# Patient Record
Sex: MALE | Race: BLACK OR AFRICAN AMERICAN | NOT HISPANIC OR LATINO
[De-identification: names, ages, dates, MRNs, and addresses within clinical notes are randomized per-mention and may not be internally consistent; named-entity substitution may affect disease eponyms.]

---

## 2017-01-05 ENCOUNTER — APPOINTMENT (OUTPATIENT)
Dept: GASTROENTEROLOGY | Facility: CLINIC | Age: 62
End: 2017-01-05

## 2017-01-05 VITALS
HEIGHT: 69 IN | SYSTOLIC BLOOD PRESSURE: 123 MMHG | DIASTOLIC BLOOD PRESSURE: 70 MMHG | BODY MASS INDEX: 26.96 KG/M2 | RESPIRATION RATE: 16 BRPM | WEIGHT: 182 LBS | TEMPERATURE: 98.4 F

## 2017-01-05 DIAGNOSIS — Z28.21 IMMUNIZATION NOT CARRIED OUT BECAUSE OF PATIENT REFUSAL: ICD-10-CM

## 2017-01-05 DIAGNOSIS — Z12.5 ENCOUNTER FOR SCREENING FOR MALIGNANT NEOPLASM OF PROSTATE: ICD-10-CM

## 2017-01-05 DIAGNOSIS — Z12.11 ENCOUNTER FOR SCREENING FOR MALIGNANT NEOPLASM OF COLON: ICD-10-CM

## 2017-01-05 DIAGNOSIS — Z78.9 OTHER SPECIFIED HEALTH STATUS: ICD-10-CM

## 2017-01-05 DIAGNOSIS — Z86.39 PERSONAL HISTORY OF OTHER ENDOCRINE, NUTRITIONAL AND METABOLIC DISEASE: ICD-10-CM

## 2017-01-05 DIAGNOSIS — Z84.89 FAMILY HISTORY OF OTHER SPECIFIED CONDITIONS: ICD-10-CM

## 2017-01-05 DIAGNOSIS — Z86.79 PERSONAL HISTORY OF OTHER DISEASES OF THE CIRCULATORY SYSTEM: ICD-10-CM

## 2017-01-05 DIAGNOSIS — I10 ESSENTIAL (PRIMARY) HYPERTENSION: ICD-10-CM

## 2017-01-05 DIAGNOSIS — F15.90 OTHER STIMULANT USE, UNSPECIFIED, UNCOMPLICATED: ICD-10-CM

## 2017-01-05 DIAGNOSIS — Z82.49 FAMILY HISTORY OF ISCHEMIC HEART DISEASE AND OTHER DISEASES OF THE CIRCULATORY SYSTEM: ICD-10-CM

## 2017-02-01 ENCOUNTER — APPOINTMENT (OUTPATIENT)
Dept: GASTROENTEROLOGY | Facility: AMBULATORY MEDICAL SERVICES | Age: 62
End: 2017-02-01

## 2017-02-03 ENCOUNTER — EMERGENCY (EMERGENCY)
Facility: HOSPITAL | Age: 62
LOS: 1 days | Discharge: HOME | End: 2017-02-03
Attending: EMERGENCY MEDICINE | Admitting: EMERGENCY MEDICINE
Payer: COMMERCIAL

## 2017-02-03 VITALS
HEART RATE: 62 BPM | OXYGEN SATURATION: 100 % | DIASTOLIC BLOOD PRESSURE: 92 MMHG | SYSTOLIC BLOOD PRESSURE: 150 MMHG | TEMPERATURE: 99 F | RESPIRATION RATE: 16 BRPM

## 2017-02-03 PROCEDURE — 99284 EMERGENCY DEPT VISIT MOD MDM: CPT | Mod: 25

## 2017-02-03 NOTE — ED ADULT TRIAGE NOTE - CHIEF COMPLAINT QUOTE
Pt c/o L flank & LLE cramping x 1 wk. Denies dysuria, hematuria, or all other adverse symptoms at this time.

## 2017-02-04 VITALS
DIASTOLIC BLOOD PRESSURE: 89 MMHG | OXYGEN SATURATION: 100 % | SYSTOLIC BLOOD PRESSURE: 148 MMHG | RESPIRATION RATE: 16 BRPM | TEMPERATURE: 98 F | HEART RATE: 65 BPM

## 2017-02-04 LAB
ALBUMIN SERPL ELPH-MCNC: 4.3 G/DL — SIGNIFICANT CHANGE UP (ref 3.3–5)
ALP SERPL-CCNC: 55 U/L — SIGNIFICANT CHANGE UP (ref 40–120)
ALT FLD-CCNC: 26 U/L — SIGNIFICANT CHANGE UP (ref 4–41)
APPEARANCE UR: CLEAR — SIGNIFICANT CHANGE UP
AST SERPL-CCNC: 21 U/L — SIGNIFICANT CHANGE UP (ref 4–40)
BASOPHILS # BLD AUTO: 0.02 K/UL — SIGNIFICANT CHANGE UP (ref 0–0.2)
BASOPHILS NFR BLD AUTO: 0.3 % — SIGNIFICANT CHANGE UP (ref 0–2)
BILIRUB SERPL-MCNC: 0.3 MG/DL — SIGNIFICANT CHANGE UP (ref 0.2–1.2)
BILIRUB UR-MCNC: NEGATIVE — SIGNIFICANT CHANGE UP
BLOOD UR QL VISUAL: NEGATIVE — SIGNIFICANT CHANGE UP
BUN SERPL-MCNC: 14 MG/DL — SIGNIFICANT CHANGE UP (ref 7–23)
CALCIUM SERPL-MCNC: 9.9 MG/DL — SIGNIFICANT CHANGE UP (ref 8.4–10.5)
CHLORIDE SERPL-SCNC: 99 MMOL/L — SIGNIFICANT CHANGE UP (ref 98–107)
CO2 SERPL-SCNC: 26 MMOL/L — SIGNIFICANT CHANGE UP (ref 22–31)
COLOR SPEC: YELLOW — SIGNIFICANT CHANGE UP
CREAT SERPL-MCNC: 1.21 MG/DL — SIGNIFICANT CHANGE UP (ref 0.5–1.3)
EOSINOPHIL # BLD AUTO: 0.13 K/UL — SIGNIFICANT CHANGE UP (ref 0–0.5)
EOSINOPHIL NFR BLD AUTO: 1.9 % — SIGNIFICANT CHANGE UP (ref 0–6)
GLUCOSE SERPL-MCNC: 104 MG/DL — HIGH (ref 70–99)
GLUCOSE UR-MCNC: NEGATIVE — SIGNIFICANT CHANGE UP
HCT VFR BLD CALC: 45.6 % — SIGNIFICANT CHANGE UP (ref 39–50)
HGB BLD-MCNC: 15.3 G/DL — SIGNIFICANT CHANGE UP (ref 13–17)
IMM GRANULOCYTES NFR BLD AUTO: 0 % — SIGNIFICANT CHANGE UP (ref 0–1.5)
KETONES UR-MCNC: NEGATIVE — SIGNIFICANT CHANGE UP
LEUKOCYTE ESTERASE UR-ACNC: NEGATIVE — SIGNIFICANT CHANGE UP
LYMPHOCYTES # BLD AUTO: 3.04 K/UL — SIGNIFICANT CHANGE UP (ref 1–3.3)
LYMPHOCYTES # BLD AUTO: 45.2 % — HIGH (ref 13–44)
MCHC RBC-ENTMCNC: 27.3 PG — SIGNIFICANT CHANGE UP (ref 27–34)
MCHC RBC-ENTMCNC: 33.6 % — SIGNIFICANT CHANGE UP (ref 32–36)
MCV RBC AUTO: 81.3 FL — SIGNIFICANT CHANGE UP (ref 80–100)
MONOCYTES # BLD AUTO: 0.4 K/UL — SIGNIFICANT CHANGE UP (ref 0–0.9)
MONOCYTES NFR BLD AUTO: 5.9 % — SIGNIFICANT CHANGE UP (ref 2–14)
MUCOUS THREADS # UR AUTO: SIGNIFICANT CHANGE UP
NEUTROPHILS # BLD AUTO: 3.14 K/UL — SIGNIFICANT CHANGE UP (ref 1.8–7.4)
NEUTROPHILS NFR BLD AUTO: 46.7 % — SIGNIFICANT CHANGE UP (ref 43–77)
NITRITE UR-MCNC: NEGATIVE — SIGNIFICANT CHANGE UP
NON-SQ EPI CELLS # UR AUTO: <1 — SIGNIFICANT CHANGE UP
PH UR: 5.5 — SIGNIFICANT CHANGE UP (ref 4.6–8)
PLATELET # BLD AUTO: 185 K/UL — SIGNIFICANT CHANGE UP (ref 150–400)
PMV BLD: 11.5 FL — SIGNIFICANT CHANGE UP (ref 7–13)
POTASSIUM SERPL-MCNC: 4.4 MMOL/L — SIGNIFICANT CHANGE UP (ref 3.5–5.3)
POTASSIUM SERPL-SCNC: 4.4 MMOL/L — SIGNIFICANT CHANGE UP (ref 3.5–5.3)
PROT SERPL-MCNC: 7.5 G/DL — SIGNIFICANT CHANGE UP (ref 6–8.3)
PROT UR-MCNC: NEGATIVE — SIGNIFICANT CHANGE UP
RBC # BLD: 5.61 M/UL — SIGNIFICANT CHANGE UP (ref 4.2–5.8)
RBC # FLD: 13.4 % — SIGNIFICANT CHANGE UP (ref 10.3–14.5)
RBC CASTS # UR COMP ASSIST: SIGNIFICANT CHANGE UP (ref 0–?)
SODIUM SERPL-SCNC: 138 MMOL/L — SIGNIFICANT CHANGE UP (ref 135–145)
SP GR SPEC: 1.02 — SIGNIFICANT CHANGE UP (ref 1–1.03)
UROBILINOGEN FLD QL: NORMAL E.U. — SIGNIFICANT CHANGE UP (ref 0.1–0.2)
WBC # BLD: 6.73 K/UL — SIGNIFICANT CHANGE UP (ref 3.8–10.5)
WBC # FLD AUTO: 6.73 K/UL — SIGNIFICANT CHANGE UP (ref 3.8–10.5)
WBC UR QL: SIGNIFICANT CHANGE UP (ref 0–?)

## 2017-02-04 RX ORDER — KETOROLAC TROMETHAMINE 30 MG/ML
15 SYRINGE (ML) INJECTION ONCE
Qty: 0 | Refills: 0 | Status: DISCONTINUED | OUTPATIENT
Start: 2017-02-04 | End: 2017-02-04

## 2017-02-04 RX ORDER — DIAZEPAM 5 MG
1 TABLET ORAL
Qty: 6 | Refills: 0 | OUTPATIENT
Start: 2017-02-04 | End: 2017-02-06

## 2017-02-04 RX ADMIN — Medication 15 MILLIGRAM(S): at 04:35

## 2017-02-04 RX ADMIN — Medication 15 MILLIGRAM(S): at 04:21

## 2017-02-04 NOTE — ED PROVIDER NOTE - MEDICAL DECISION MAKING DETAILS
60 y/o M with DM, HTN presents with left lower back pain. No red flag symptoms. Point tenderness to paraspinal musculature in lumbar spine. Will check kidney function and evaluate for UTI

## 2017-02-04 NOTE — ED ADULT NURSE NOTE - OBJECTIVE STATEMENT
Pt rcvd to 27 c/o 7/10 L Flank rad to L Hip/back of Thigh pain since Saturday.  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  Took unknown pills his wife gave him last night for pain but w/o relief.  Describes pain as cramping, intermitt better/worse but w/o specific triggers.  Pt denies hematuria/dysuria/frequency/fevers/chills or hx kidney stones/UTIs. PMH DM, HTN compliant w/ meds - FS 110s per normal.  Pt aaox4, ambulatory, VSS, in NAD at this time, awaits MD Lemon. IVL placed R AC 20g, labs/urine obtained, will CTM.

## 2017-02-04 NOTE — ED PROVIDER NOTE - OBJECTIVE STATEMENT
60 y/o M wit hh/o HTN, DM presents with left lower back pain x 1 week. Pain is worsened with movement and after standing from sitting for a long time. States there is radiation to the posterior aspect of left leg. No fevers, chills, bowel/bladder incontinence, recent weight loss, trauma ,weakness, numbness, tingling, dysuria, hematuria. 62 y/o M wit hh/o HTN, DM presents with left lower back pain x 1 week. Pain is worsened with movement and after standing from sitting for a long time. States there is radiation to the posterior aspect of left leg. No fevers, chills, bowel/bladder incontinence, recent weight loss, trauma ,weakness, numbness, tingling, dysuria, hematuria.  Attending - Agree with above.  I evaluated patient myself.  62 y/o M c/o left lumbar area back pain x 1 week.  Worse with movement or twisting.  No known preceding trauma.  No abd pain.  No urinary complaints.  Denies any numbness or weakness.  Pain sometimes radiates into left posterior thigh.

## 2017-02-04 NOTE — ED PROVIDER NOTE - PHYSICAL EXAMINATION
ATTENDING PHYSICAL EXAM DR. Graham ***GEN - NAD; well appearing; A+O x3 ***HEAD - NC/AT ***EYES/NOSE - PERRL, EOMI, mucous membranes moist, no discharge ***THROAT: Oral cavity and pharynx normal. No inflammation, swelling, exudate, or lesions.  ***NECK: Neck supple, non-tender without lymphadenopathy, no masses, no JVD.   ***PULMONARY - CTA b/l, symmetric breath sounds. ***CARDIAC -s1s2, RRR, no M,R,G  ***ABDOMEN - +BS, ND, NT, soft, no guarding, no rebound, no masses   ***BACK - no CVA tenderness, mild left lumbar area soreness to palpation ***EXTREMITIES - symmetric pulses, 2+ dp, capillary refill < 2 seconds, no clubbing, no cyanosis, no edema ***SKIN - no rash or bruising   ***NEUROLOGIC - alert, CN 2-12 intact, reflexes nl, sensation nl, coordination nl, finger to nose nl, romberg negative, motor 5/5 RUE/LUE/RLE/LLE/EHL/Plantar flexion, no pronator drift, gait nl

## 2017-02-05 LAB
BACTERIA UR CULT: SIGNIFICANT CHANGE UP
SPECIMEN SOURCE: SIGNIFICANT CHANGE UP

## 2017-03-08 ENCOUNTER — APPOINTMENT (OUTPATIENT)
Dept: GASTROENTEROLOGY | Facility: AMBULATORY MEDICAL SERVICES | Age: 62
End: 2017-03-08

## 2017-03-27 ENCOUNTER — APPOINTMENT (OUTPATIENT)
Dept: GASTROENTEROLOGY | Facility: CLINIC | Age: 62
End: 2017-03-27

## 2017-05-04 ENCOUNTER — APPOINTMENT (OUTPATIENT)
Dept: GASTROENTEROLOGY | Facility: CLINIC | Age: 62
End: 2017-05-04

## 2017-05-04 VITALS
HEIGHT: 69 IN | TEMPERATURE: 98.2 F | DIASTOLIC BLOOD PRESSURE: 80 MMHG | BODY MASS INDEX: 27.4 KG/M2 | WEIGHT: 185 LBS | SYSTOLIC BLOOD PRESSURE: 120 MMHG

## 2017-05-04 DIAGNOSIS — Z09 ENCOUNTER FOR FOLLOW-UP EXAMINATION AFTER COMPLETED TREATMENT FOR CONDITIONS OTHER THAN MALIGNANT NEOPLASM: ICD-10-CM

## 2017-05-04 DIAGNOSIS — R51 HEADACHE: ICD-10-CM

## 2017-05-04 DIAGNOSIS — Z71.89 OTHER SPECIFIED COUNSELING: ICD-10-CM

## 2017-05-04 DIAGNOSIS — E11.9 TYPE 2 DIABETES MELLITUS W/OUT COMPLICATIONS: ICD-10-CM

## 2017-05-04 DIAGNOSIS — K57.30 DIVERTICULOSIS OF LARGE INTESTINE W/OUT PERFORATION OR ABSCESS W/OUT BLEEDING: ICD-10-CM

## 2017-05-04 DIAGNOSIS — K63.5 POLYP OF COLON: ICD-10-CM

## 2018-02-04 ENCOUNTER — INPATIENT (INPATIENT)
Facility: HOSPITAL | Age: 63
LOS: 0 days | Discharge: HOME | End: 2018-02-05
Attending: INTERNAL MEDICINE | Admitting: INTERNAL MEDICINE
Payer: COMMERCIAL

## 2018-02-04 VITALS
DIASTOLIC BLOOD PRESSURE: 75 MMHG | RESPIRATION RATE: 18 BRPM | HEART RATE: 62 BPM | TEMPERATURE: 98 F | OXYGEN SATURATION: 100 % | SYSTOLIC BLOOD PRESSURE: 147 MMHG

## 2018-02-04 DIAGNOSIS — N17.9 ACUTE KIDNEY FAILURE, UNSPECIFIED: ICD-10-CM

## 2018-02-04 DIAGNOSIS — Z29.9 ENCOUNTER FOR PROPHYLACTIC MEASURES, UNSPECIFIED: ICD-10-CM

## 2018-02-04 DIAGNOSIS — E11.9 TYPE 2 DIABETES MELLITUS WITHOUT COMPLICATIONS: ICD-10-CM

## 2018-02-04 DIAGNOSIS — R07.9 CHEST PAIN, UNSPECIFIED: ICD-10-CM

## 2018-02-04 DIAGNOSIS — I10 ESSENTIAL (PRIMARY) HYPERTENSION: ICD-10-CM

## 2018-02-04 DIAGNOSIS — I20.9 ANGINA PECTORIS, UNSPECIFIED: ICD-10-CM

## 2018-02-04 LAB
ALBUMIN SERPL ELPH-MCNC: 4.4 G/DL — SIGNIFICANT CHANGE UP (ref 3.3–5)
ALP SERPL-CCNC: 55 U/L — SIGNIFICANT CHANGE UP (ref 40–120)
ALT FLD-CCNC: 33 U/L — SIGNIFICANT CHANGE UP (ref 4–41)
AST SERPL-CCNC: 30 U/L — SIGNIFICANT CHANGE UP (ref 4–40)
BASOPHILS # BLD AUTO: 0.04 K/UL — SIGNIFICANT CHANGE UP (ref 0–0.2)
BASOPHILS NFR BLD AUTO: 0.6 % — SIGNIFICANT CHANGE UP (ref 0–2)
BILIRUB SERPL-MCNC: 0.2 MG/DL — SIGNIFICANT CHANGE UP (ref 0.2–1.2)
BUN SERPL-MCNC: 17 MG/DL — SIGNIFICANT CHANGE UP (ref 7–23)
CALCIUM SERPL-MCNC: 9.2 MG/DL — SIGNIFICANT CHANGE UP (ref 8.4–10.5)
CHLORIDE SERPL-SCNC: 100 MMOL/L — SIGNIFICANT CHANGE UP (ref 98–107)
CHOLEST SERPL-MCNC: 165 MG/DL — SIGNIFICANT CHANGE UP (ref 120–199)
CK MB BLD-MCNC: 1.3 — SIGNIFICANT CHANGE UP (ref 0–2.5)
CK MB BLD-MCNC: 1.4 — SIGNIFICANT CHANGE UP (ref 0–2.5)
CK MB BLD-MCNC: 2.87 NG/ML — SIGNIFICANT CHANGE UP (ref 1–6.6)
CK MB BLD-MCNC: 3.09 NG/ML — SIGNIFICANT CHANGE UP (ref 1–6.6)
CK MB BLD-MCNC: 3.42 NG/ML — SIGNIFICANT CHANGE UP (ref 1–6.6)
CK SERPL-CCNC: 217 U/L — HIGH (ref 30–200)
CK SERPL-CCNC: 218 U/L — HIGH (ref 30–200)
CK SERPL-CCNC: 237 U/L — HIGH (ref 30–200)
CO2 SERPL-SCNC: 28 MMOL/L — SIGNIFICANT CHANGE UP (ref 22–31)
CREAT SERPL-MCNC: 1.48 MG/DL — HIGH (ref 0.5–1.3)
EOSINOPHIL # BLD AUTO: 0.16 K/UL — SIGNIFICANT CHANGE UP (ref 0–0.5)
EOSINOPHIL NFR BLD AUTO: 2.4 % — SIGNIFICANT CHANGE UP (ref 0–6)
GLUCOSE BLDC GLUCOMTR-MCNC: 115 MG/DL — HIGH (ref 70–99)
GLUCOSE BLDC GLUCOMTR-MCNC: 139 MG/DL — HIGH (ref 70–99)
GLUCOSE SERPL-MCNC: 106 MG/DL — HIGH (ref 70–99)
HBA1C BLD-MCNC: 7.3 % — HIGH (ref 4–5.6)
HCT VFR BLD CALC: 42.1 % — SIGNIFICANT CHANGE UP (ref 39–50)
HDLC SERPL-MCNC: 51 MG/DL — SIGNIFICANT CHANGE UP (ref 35–55)
HGB BLD-MCNC: 14.5 G/DL — SIGNIFICANT CHANGE UP (ref 13–17)
IMM GRANULOCYTES # BLD AUTO: 0.02 # — SIGNIFICANT CHANGE UP
IMM GRANULOCYTES NFR BLD AUTO: 0.3 % — SIGNIFICANT CHANGE UP (ref 0–1.5)
LIPID PNL WITH DIRECT LDL SERPL: 105 MG/DL — SIGNIFICANT CHANGE UP
LYMPHOCYTES # BLD AUTO: 2.58 K/UL — SIGNIFICANT CHANGE UP (ref 1–3.3)
LYMPHOCYTES # BLD AUTO: 38.5 % — SIGNIFICANT CHANGE UP (ref 13–44)
MCHC RBC-ENTMCNC: 27.7 PG — SIGNIFICANT CHANGE UP (ref 27–34)
MCHC RBC-ENTMCNC: 34.4 % — SIGNIFICANT CHANGE UP (ref 32–36)
MCV RBC AUTO: 80.5 FL — SIGNIFICANT CHANGE UP (ref 80–100)
MONOCYTES # BLD AUTO: 0.51 K/UL — SIGNIFICANT CHANGE UP (ref 0–0.9)
MONOCYTES NFR BLD AUTO: 7.6 % — SIGNIFICANT CHANGE UP (ref 2–14)
NEUTROPHILS # BLD AUTO: 3.4 K/UL — SIGNIFICANT CHANGE UP (ref 1.8–7.4)
NEUTROPHILS NFR BLD AUTO: 50.6 % — SIGNIFICANT CHANGE UP (ref 43–77)
NRBC # FLD: 0 — SIGNIFICANT CHANGE UP
PLATELET # BLD AUTO: 183 K/UL — SIGNIFICANT CHANGE UP (ref 150–400)
PMV BLD: 11.1 FL — SIGNIFICANT CHANGE UP (ref 7–13)
POTASSIUM SERPL-MCNC: 4.4 MMOL/L — SIGNIFICANT CHANGE UP (ref 3.5–5.3)
POTASSIUM SERPL-SCNC: 4.4 MMOL/L — SIGNIFICANT CHANGE UP (ref 3.5–5.3)
PROT SERPL-MCNC: 7.2 G/DL — SIGNIFICANT CHANGE UP (ref 6–8.3)
RBC # BLD: 5.23 M/UL — SIGNIFICANT CHANGE UP (ref 4.2–5.8)
RBC # FLD: 13.6 % — SIGNIFICANT CHANGE UP (ref 10.3–14.5)
SODIUM SERPL-SCNC: 140 MMOL/L — SIGNIFICANT CHANGE UP (ref 135–145)
TRIGL SERPL-MCNC: 101 MG/DL — SIGNIFICANT CHANGE UP (ref 10–149)
TROPONIN T SERPL-MCNC: < 0.06 NG/ML — SIGNIFICANT CHANGE UP (ref 0–0.06)
WBC # BLD: 6.71 K/UL — SIGNIFICANT CHANGE UP (ref 3.8–10.5)
WBC # FLD AUTO: 6.71 K/UL — SIGNIFICANT CHANGE UP (ref 3.8–10.5)

## 2018-02-04 PROCEDURE — 71046 X-RAY EXAM CHEST 2 VIEWS: CPT | Mod: 26

## 2018-02-04 RX ORDER — ASPIRIN/CALCIUM CARB/MAGNESIUM 324 MG
162 TABLET ORAL ONCE
Qty: 0 | Refills: 0 | Status: COMPLETED | OUTPATIENT
Start: 2018-02-04 | End: 2018-02-04

## 2018-02-04 RX ORDER — SODIUM CHLORIDE 9 MG/ML
1000 INJECTION INTRAMUSCULAR; INTRAVENOUS; SUBCUTANEOUS ONCE
Qty: 0 | Refills: 0 | Status: COMPLETED | OUTPATIENT
Start: 2018-02-04 | End: 2018-02-04

## 2018-02-04 RX ORDER — ASPIRIN/CALCIUM CARB/MAGNESIUM 324 MG
81 TABLET ORAL DAILY
Qty: 0 | Refills: 0 | Status: DISCONTINUED | OUTPATIENT
Start: 2018-02-05 | End: 2018-02-05

## 2018-02-04 RX ORDER — LISINOPRIL 2.5 MG/1
10 TABLET ORAL DAILY
Qty: 0 | Refills: 0 | Status: DISCONTINUED | OUTPATIENT
Start: 2018-02-04 | End: 2018-02-05

## 2018-02-04 RX ADMIN — Medication 162 MILLIGRAM(S): at 02:32

## 2018-02-04 RX ADMIN — SODIUM CHLORIDE 1000 MILLILITER(S): 9 INJECTION INTRAMUSCULAR; INTRAVENOUS; SUBCUTANEOUS at 12:23

## 2018-02-04 RX ADMIN — LISINOPRIL 10 MILLIGRAM(S): 2.5 TABLET ORAL at 08:58

## 2018-02-04 NOTE — CONSULT NOTE ADULT - SUBJECTIVE AND OBJECTIVE BOX
HISTORY OF PRESENT ILLNESS:61 yo male PMHx of DM and HTN p/w chest pain x 2 days. Chest pain described as midsternal ache, 5/10, radiating to left arm, non-pleuritic and intermittent in nature where each episode lasts about 1 hr. No alleviating or aggravating factors identified. Never had this pain before. Pt denies fever, chills, diaphoresis, SOB, palpitations, nausea, vomiting or abdominal pain. Last stress test reported 2 yrs ago normal. (04 Feb 2018 07:41)  patient seen in er.  no chest pain at present.      PAST MEDICAL & SURGICAL HISTORY:  Diabetes  Hypertension  No significant past surgical history      [ ] Diabetes   [ ] Hypertension  [ ] Hyperlipidemia  [ ] CAD  [ ] PCI  [ ] CABG    PREVIOUS DIAGNOSTIC TESTING:    [ ] Echocardiogram:  [ ]  Catheterization:  [ ] Stress Test:  	    MEDICATIONS:  lisinopril 10 milliGRAM(s) Oral daily                  Allergies    No Known Allergies    Intolerances        FAMILY HISTORY:  No pertinent family history      SOCIAL HISTORY:    [ ] Non-smoker  [ ] Former Smoker  [ ] Current Smoker  [ ] Alcohol      REVIEW OF SYSTEMS:  [ ]chest pain  [  ]shortness of breath  [  ]palpitations  [  ]syncope  [ ]near syncope [  ]diplopia  [  ]altered mental status   [  ]fevers  [ ]chills [ ]nausea  [ ] vomiting  [ ]abdominal pain  [ ]melena  [ ]BRBPR  [  ]epistaxis  [  ]rash  [  ]lower extremity edema          [ ] All others negative	  [ ] Unable to obtain    PHYSICAL EXAM:  T(C): 36.6 (02-04-18 @ 15:30), Max: 36.8 (02-04-18 @ 11:00)  HR: 63 (02-04-18 @ 15:30) (57 - 88)  BP: 127/81 (02-04-18 @ 15:30) (112/62 - 147/75)  RR: 18 (02-04-18 @ 15:30) (15 - 18)  SpO2: 100% (02-04-18 @ 15:30) (97% - 100%)  Wt(kg): --  I&O's Summary      Appearance: No Signs of acute distress  HEENT:   Normal oral mucosa, PERRL, EOMI	  Lymphatic: No lymphadenopathy  Cardiovascular: Normal S1 S2, No JVD, No murmurs, No edema  Respiratory: Lungs clear to auscultation	  Gastrointestinal:  Soft, Non-tender, + BS		  Extremities:  No clubbing, cyanosis or edema  Vascular: Peripheral pulses palpable 2+ bilaterally    TELEMETRY: 	SR     ECG:  sinus bradycardia, non-specific ST/T wave changes	  RADIOLOGY:  CXR   clear lungs     OTHER: 	  	  LABS:	 	    CARDIAC MARKERS:      CKMB: 3.09 ng/mL (02-04 @ 06:45)  CKMB: 3.42 ng/mL (02-04 @ 02:30)    CKMB Relative Index: 1.4 (02-04 @ 06:45)                            14.5   6.71  )-----------( 183      ( 04 Feb 2018 02:30 )             42.1     02-04    140  |  100  |  17  ----------------------------<  106<H>  4.4   |  28  |  1.48<H>    Ca    9.2      04 Feb 2018 02:30    TPro  7.2  /  Alb  4.4  /  TBili  0.2  /  DBili  x   /  AST  30  /  ALT  33  /  AlkPhos  55  02-04    proBNP:   Lipid Profile:   HgA1c: Hemoglobin A1C, Whole Blood: 7.3 % (02-04 @ 06:45)    TSH:     ASSESSMENT/PLAN: 	62y Male with pmhx of HTN , HLD ,   P/W  new onset Chest pain     -CE trops neg x 2     -->218-->   check CE #3 ordered in [place]   -EKG w/o acute changes   -monitor on tele   -f/u Echo   - f/u stress test if + then CATH   -ordered TSH w/ AM labs   -RAND    given IVFs    will d/w attending renal cs HISTORY OF PRESENT ILLNESS:63 yo male PMHx of DM and HTN p/w chest pain x 2 days. Chest pain described as midsternal ache, 5/10, radiating to left arm, non-pleuritic and intermittent in nature where each episode lasts about 1 hr. No alleviating or aggravating factors identified. Never had this pain before. Pt denies fever, chills, diaphoresis, SOB, palpitations, nausea, vomiting or abdominal pain. Last stress test reported 2 yrs ago normal. (04 Feb 2018 07:41)  patient seen in er.  no chest pain at present.      PAST MEDICAL & SURGICAL HISTORY:  Diabetes  Hypertension    No significant past surgical history      [x ] Diabetes   [x ] Hypertension  [ ] Hyperlipidemia  [ ] CAD  [ ] PCI  [ ] CABG    PREVIOUS DIAGNOSTIC TESTING:  NONE in [place]   [ ] Echocardiogram:   [ ]  Catheterization:  [ ] Stress Test:  	    MEDICATIONS:  lisinopril 10 milliGRAM(s) Oral daily    Allergies    No Known Allergies    Intolerances        FAMILY HISTORY:  No pertinent family history      SOCIAL HISTORY:    [x ] Non-smoker  [ ] Former Smoker  [ ] Current Smoker  [ ] Alcohol      REVIEW OF SYSTEMS:  [x ]chest pain  [  ]shortness of breath  [  ]palpitations  [  ]syncope  [ ]near syncope [  ]diplopia  [  ]altered mental status   [  ]fevers  [ ]chills [ ]nausea  [ ] vomiting  [ ]abdominal pain  [ ]melena  [ ]BRBPR  [  ]epistaxis  [  ]rash  [  ]lower extremity edema          [x ] All others negative	  [ ] Unable to obtain    PHYSICAL EXAM:  T(C): 36.6 (02-04-18 @ 15:30), Max: 36.8 (02-04-18 @ 11:00)  HR: 63 (02-04-18 @ 15:30) (57 - 88)  BP: 127/81 (02-04-18 @ 15:30) (112/62 - 147/75)  RR: 18 (02-04-18 @ 15:30) (15 - 18)  SpO2: 100% (02-04-18 @ 15:30) (97% - 100%)  Wt(kg): --  I&O's Summary      Appearance: No Signs of acute distress  HEENT:   Normal oral mucosa, PERRL, EOMI	  Lymphatic: No lymphadenopathy  Cardiovascular: Normal S1 S2, No JVD, No murmurs, No edema  Respiratory: Lungs clear to auscultation	  Gastrointestinal:  Soft, Non-tender, + BS		  Extremities:  No clubbing, cyanosis or edema  Vascular: Peripheral pulses palpable 2+ bilaterally    TELEMETRY: 	SR     ECG:  sinus bradycardia, non-specific ST/T wave changes	  RADIOLOGY:  CXR   clear lungs     OTHER: 	  	  LABS:	 	    CARDIAC MARKERS:      CKMB: 3.09 ng/mL (02-04 @ 06:45)  CKMB: 3.42 ng/mL (02-04 @ 02:30)    CKMB Relative Index: 1.4 (02-04 @ 06:45)                            14.5   6.71  )-----------( 183      ( 04 Feb 2018 02:30 )             42.1     02-04    140  |  100  |  17  ----------------------------<  106<H>  4.4   |  28  |  1.48<H>    Ca    9.2      04 Feb 2018 02:30    TPro  7.2  /  Alb  4.4  /  TBili  0.2  /  DBili  x   /  AST  30  /  ALT  33  /  AlkPhos  55  02-04    proBNP:   Lipid Profile:   HgA1c: Hemoglobin A1C, Whole Blood: 7.3 % (02-04 @ 06:45)    TSH:     ASSESSMENT/PLAN: 	62y Male with pmhx of HTN , HLD ,   P/W  new onset Chest pain     -CE trops neg x 2     -->218-->   check CE #3 ordered in [place]   -EKG w/o acute changes   -monitor on tele   -f/u Echo   - f/u stress test if + then CATH   -ordered TSH w/ AM labs   c/w ASA and lisinopril   -RAND    given IVFs    will d/w attending renal cs

## 2018-02-04 NOTE — CONSULT NOTE ADULT - SUBJECTIVE AND OBJECTIVE BOX
Requesting Physician : Dr. Humphreys    Reason for Consultation: CAD/unstable angina    HISTORY OF PRESENT ILLNESS: HPI:  63 yo male PMHx of DM and HTN p/w chest pain x 2 days. Chest pain described as midsternal ache, 5/10, radiating to left arm, non-pleuritic and intermittent in nature where each episode lasts about 1 hr. No alleviating or aggravating factors identified. Never had this pain before. Pt denies fever, chills, diaphoresis, SOB, palpitations, nausea, vomiting or abdominal pain. Last stress test reported 2 yrs ago normal. (04 Feb 2018 07:41)  patient seen in er.  no chest pain at present.      PAST MEDICAL & SURGICAL HISTORY:  Diabetes  Hypertension  No significant past surgical history          MEDICATIONS:  MEDICATIONS  (STANDING):  lisinopril 10 milliGRAM(s) Oral daily      Allergies    No Known Allergies    Intolerances        FAMILY HISTORY:  No pertinent family history    Non-contributary for premature coronary disease or sudden cardiac death    SOCIAL HISTORY:    [ ] Non-smoker  [ ] Smoker  [ ] Alcohol      REVIEW OF SYSTEMS:  [y ]chest pain  [o  ]shortness of breath  [ y ]palpitations  [n  ]syncope  [n ]near syncope [n ]upper extremity weakness     [ ] All others negative	  [ ] Unable to obtain    PHYSICAL EXAM:  T(C): 36.8 (02-04-18 @ 11:00), Max: 36.8 (02-04-18 @ 11:00)  HR: 88 (02-04-18 @ 11:00) (57 - 88)  BP: 123/81 (02-04-18 @ 11:00) (112/62 - 147/75)  RR: 18 (02-04-18 @ 11:00) (15 - 18)  SpO2: 97% (02-04-18 @ 11:00) (97% - 100%)  Wt(kg): --  I&O's Summary        HEENT:   Normal oral mucosa, PERRL, EOMI	  Lymphatic: No obvious lymphadenopathy , no edema  Cardiovascular: Normal S1 S2, No JVD,  1/6 ADEBAYO murmur , Peripheral pulses palpable 2+ bilaterally  Respiratory: Lungs clear to auscultation, normal effort 	  Gastrointestinal:  Soft, Non-tender, + BS	  Skin: No rashes, No cyanosis, warm to touch  Musculoskeletal: Normal range of motion, normal strength  Psychiatry:  Appropriate Mood & affect   EXT: no clubbing/cyanosis/edma    TELEMETRY: NSR	    ECG:  	NSR/non specific st/t changes  	  	  LABS:	 	    CARDIAC MARKERS:  CARDIAC MARKERS ( 04 Feb 2018 06:45 )  x     / < 0.06 ng/mL / 218 u/L / 3.09 ng/mL / x      CARDIAC MARKERS ( 04 Feb 2018 02:30 )  x     / < 0.06 ng/mL / 237 u/L / 3.42 ng/mL / x                                  14.5   6.71  )-----------( 183      ( 04 Feb 2018 02:30 )             42.1     Hb Trend: 14.5<--    02-04    140  |  100  |  17  ----------------------------<  106<H>  4.4   |  28  |  1.48<H>    Ca    9.2      04 Feb 2018 02:30    TPro  7.2  /  Alb  4.4  /  TBili  0.2  /  DBili  x   /  AST  30  /  ALT  33  /  AlkPhos  55  02-04    Creatinine Trend: 1.48<--     HgA1c: Hemoglobin A1C, Whole Blood: 7.3 % (02-04 @ 06:45)    TSH:     ASSESSMENT/PLAN: 	62yMale with pmhx of htn/hyperlipidemia admitted with chest pain/?unstable angina  1) patient with new onset chest pain --however no acute EKG changes  2) no need for IV heparin at present  3) no need for urgent cath  4) agree with tele  5) iv heparin/cath if CE positive  6) echo/tst if CE negative

## 2018-02-04 NOTE — ED ADULT TRIAGE NOTE - CHIEF COMPLAINT QUOTE
chest pain    intermittent left sided chest pain rad to arm for 1 week.  denies sob, nausea, sweating, dizziness, hx of dm and htn...denies any cardiac hx or surgery

## 2018-02-04 NOTE — H&P ADULT - HISTORY OF PRESENT ILLNESS
61 yo male PMHx of DM and HTN p/w chest pain x 2 days. Chest pain described as midsternal ache, 5/10, radiating to left arm, non-pleuritic and intermittent in nature where each episode lasts about 1 hr. No alleviating or aggravating factors identified. Never had this pain before. Pt denies fever, chills, diaphoresis, SOB, palpitations, nausea, vomiting or abdominal pain. Last stress test reported 2 yrs ago normal.

## 2018-02-04 NOTE — ED PROVIDER NOTE - OBJECTIVE STATEMENT
02:18 Elsa att: 62M h/o htn, dm c/o cp. 02:18 Elsa att: 62M h/o htn, dm c/o cp x 1 wk. Past week intermitt non-exertional left sided chest pain, occ radiation lue.     PMH htn, dm PSH rotator cuff repair MED trulisty, lisinopril, occ baby asa ALL nkda SMOKE denies SH 1-2 drinks 3x a week PCP PHARMACY 02:18 Elsa att: 62M h/o htn, dm c/o cp x 1 wk. Past week intermitt non-exertional left sided chest pain, occ radiation lue. Denies sob, nausea, diaphoresis. Came to ED today for persistent symptoms. November 2017 ekg and monitor, no stress or echo. PMH htn, dm PSH rotator cuff repair MED trulisty, lisinopril, occ baby asa ALL nkda SMOKE denies SH 1-2 drinks 3x a week PCP [facility]

## 2018-02-04 NOTE — H&P ADULT - PROBLEM SELECTOR PLAN 1
tele monitor   cardiac enzymes x 2  Serial EKGs  DASH 1800 ADA diet   continue ASA, lisinopril.   Consider Stress test  FLP, HgA1c

## 2018-02-04 NOTE — H&P ADULT - NSHPLABSRESULTS_GEN_ALL_CORE
EKG: NSR @ 61 bpm  BUN/Cr: 17/1.48  CXR: clear lungs  Dave x1: neg EKG: NSR @ 61 bpm w/ Poor R-wave progression in V1-V6.  BUN/Cr: 17/1.48  CXR: clear lungs  Dave x1: neg

## 2018-02-04 NOTE — H&P ADULT - RS GEN PE MLT RESP DETAILS PC
clear to auscultation bilaterally/good air movement/chest wall tenderness/breath sounds equal/respirations non-labored/airway patent

## 2018-02-04 NOTE — H&P ADULT - NEGATIVE CARDIOVASCULAR SYMPTOMS
no dyspnea on exertion/no claudication/no palpitations/no orthopnea/no paroxysmal nocturnal dyspnea/no peripheral edema

## 2018-02-04 NOTE — CONSULT NOTE ADULT - SUBJECTIVE AND OBJECTIVE BOX
EP ATTENDING      HISTORY OF PRESENT ILLNESS: He is a pleasant 63 y/o male PMH HTN and DM admitted with chest pain x 2 days. His EKG and enzymes x 3 are unremarkable. EP is called because of sinus bradycardia. His baseline QRS is narrow, and he denies symptoms of bradycardia denying syncope, presyncope, nor exercise intolerence. NST and echo pending.    PAST MEDICAL & SURGICAL HISTORY:  Diabetes  Hypertension  asymptomatic sinus bradycardia    No significant past surgical history    MEDICATIONS  (STANDING):  lisinopril 10 milliGRAM(s) Oral daily    No Known Allergies    FAMILY HISTORY:  No pertinent family history  Non-contributary for premature coronary disease or sudden cardiac death    SOCIAL HISTORY:    [x ] Non-smoker  [ ] Smoker  [ ] Alcohol      REVIEW OF SYSTEMS:  [x ]chest pain  [  ]shortness of breath  [  ]palpitations  [  ]syncope  [ ]near syncope [ ]upper extremity weakness   [ ] lower extremity weakness  [  ]diplopia  [  ]altered mental status   [  ]fevers  [ ]chills [ ]nausea  [ ]vomitting  [  ]dysphagia    [ ]abdominal pain  [ ]melena  [ ]BRBPR    [  ]epistaxis  [  ]rash    [ ]lower extremity edema        [x ] All others negative	  [ ] Unable to obtain    PHYSICAL EXAM:  T(C): 36.8 (02-04-18 @ 11:00), Max: 36.8 (02-04-18 @ 11:00)  HR: 88 (02-04-18 @ 11:00) (57 - 88)  BP: 123/81 (02-04-18 @ 11:00) (112/62 - 147/75)  RR: 18 (02-04-18 @ 11:00) (15 - 18)  SpO2: 97% (02-04-18 @ 11:00) (97% - 100%)  Wt(kg): --    Appearance: Normal	  HEENT:   Normal oral mucosa, PERRL, EOMI	  Lymphatic: No lymphadenopathy , no edema  Cardiovascular: Normal S1 S2, No JVD, No murmurs , Peripheral pulses palpable 2+ bilaterally  Respiratory: Lungs clear to auscultation, normal effort 	  Gastrointestinal:  Soft, Non-tender, + BS	  Skin: No rashes, No ecchymoses, No cyanosis, warm to touch  Musculoskeletal: Normal range of motion, normal strength  Psychiatry:  Mood & affect appropriate      TELEMETRY: 	 NSR, sinus bradycardia   ECG:  	sinus bradycardia, non-specific ST/T wave changes    Echo: pending  NST: pending  Cath: n/a  	  	  LABS:	 	                          14.5   6.71  )-----------( 183      ( 04 Feb 2018 02:30 )             42.1     02-04    140  |  100  |  17  ----------------------------<  106<H>  4.4   |  28  |  1.48<H>    Ca    9.2      04 Feb 2018 02:30    TPro  7.2  /  Alb  4.4  /  TBili  0.2  /  DBili  x   /  AST  30  /  ALT  33  /  AlkPhos  55  02-04    proBNP:   Lipid Profile:   HgA1c: Hemoglobin A1C, Whole Blood: 7.3 % (02-04 @ 06:45)    TSH:     A/P) He is a pleasant 63 y/o male PMH HTN and DM admitted with chest pain x 2 days. His EKG and enzymes x 3 are unremarkable. EP is called because of sinus bradycardia. His baseline QRS is narrow, and he denies symptoms of bradycardia denying syncope, presyncope, nor exercise intolerence. NST and echo pending.    -given no symptoms would not recommend PPM  -get echo, get NST  -cath if NST abnormal  -get TSH      Mimi Gonzales M.D., [facility]  Cardiac Electrophysiology  [facility]  [address] [place] [address], [address] [place], [place] [id number]  [email or web address]    office [phone]  pager [phone]

## 2018-02-04 NOTE — H&P ADULT - ASSESSMENT
63 yo male PMHx of DM and HTN p/w chest pain radiating to L arm x 2 days, admitted to [facility] for angina pectoris, r/o ACS.

## 2018-02-05 ENCOUNTER — TRANSCRIPTION ENCOUNTER (OUTPATIENT)
Age: 63
End: 2018-02-05

## 2018-02-05 VITALS
DIASTOLIC BLOOD PRESSURE: 75 MMHG | RESPIRATION RATE: 18 BRPM | OXYGEN SATURATION: 98 % | SYSTOLIC BLOOD PRESSURE: 149 MMHG | HEART RATE: 74 BPM

## 2018-02-05 LAB
BUN SERPL-MCNC: 21 MG/DL — SIGNIFICANT CHANGE UP (ref 7–23)
CALCIUM SERPL-MCNC: 8.8 MG/DL — SIGNIFICANT CHANGE UP (ref 8.4–10.5)
CHLORIDE SERPL-SCNC: 101 MMOL/L — SIGNIFICANT CHANGE UP (ref 98–107)
CK MB BLD-MCNC: 1.1 — SIGNIFICANT CHANGE UP (ref 0–2.5)
CK MB BLD-MCNC: 2.2 NG/ML — SIGNIFICANT CHANGE UP (ref 1–6.6)
CK SERPL-CCNC: 205 U/L — HIGH (ref 30–200)
CO2 SERPL-SCNC: 28 MMOL/L — SIGNIFICANT CHANGE UP (ref 22–31)
CREAT SERPL-MCNC: 1.23 MG/DL — SIGNIFICANT CHANGE UP (ref 0.5–1.3)
GLUCOSE BLDC GLUCOMTR-MCNC: 113 MG/DL — HIGH (ref 70–99)
GLUCOSE SERPL-MCNC: 137 MG/DL — HIGH (ref 70–99)
HCT VFR BLD CALC: 42.9 % — SIGNIFICANT CHANGE UP (ref 39–50)
HGB BLD-MCNC: 14.8 G/DL — SIGNIFICANT CHANGE UP (ref 13–17)
MCHC RBC-ENTMCNC: 28.1 PG — SIGNIFICANT CHANGE UP (ref 27–34)
MCHC RBC-ENTMCNC: 34.5 % — SIGNIFICANT CHANGE UP (ref 32–36)
MCV RBC AUTO: 81.4 FL — SIGNIFICANT CHANGE UP (ref 80–100)
NRBC # FLD: 0 — SIGNIFICANT CHANGE UP
PLATELET # BLD AUTO: 173 K/UL — SIGNIFICANT CHANGE UP (ref 150–400)
PMV BLD: 12.1 FL — SIGNIFICANT CHANGE UP (ref 7–13)
POTASSIUM SERPL-MCNC: 4.3 MMOL/L — SIGNIFICANT CHANGE UP (ref 3.5–5.3)
POTASSIUM SERPL-SCNC: 4.3 MMOL/L — SIGNIFICANT CHANGE UP (ref 3.5–5.3)
RBC # BLD: 5.27 M/UL — SIGNIFICANT CHANGE UP (ref 4.2–5.8)
RBC # FLD: 13.8 % — SIGNIFICANT CHANGE UP (ref 10.3–14.5)
SODIUM SERPL-SCNC: 140 MMOL/L — SIGNIFICANT CHANGE UP (ref 135–145)
TSH SERPL-MCNC: 0.83 UIU/ML — SIGNIFICANT CHANGE UP (ref 0.27–4.2)
WBC # BLD: 5.72 K/UL — SIGNIFICANT CHANGE UP (ref 3.8–10.5)
WBC # FLD AUTO: 5.72 K/UL — SIGNIFICANT CHANGE UP (ref 3.8–10.5)

## 2018-02-05 PROCEDURE — 93306 TTE W/DOPPLER COMPLETE: CPT | Mod: 26

## 2018-02-05 PROCEDURE — 78452 HT MUSCLE IMAGE SPECT MULT: CPT | Mod: 26

## 2018-02-05 PROCEDURE — 93016 CV STRESS TEST SUPVJ ONLY: CPT | Mod: GC

## 2018-02-05 PROCEDURE — 93018 CV STRESS TEST I&R ONLY: CPT | Mod: GC

## 2018-02-05 RX ORDER — INFLUENZA VIRUS VACCINE 15; 15; 15; 15 UG/.5ML; UG/.5ML; UG/.5ML; UG/.5ML
0.5 SUSPENSION INTRAMUSCULAR ONCE
Qty: 0 | Refills: 0 | Status: DISCONTINUED | OUTPATIENT
Start: 2018-02-05 | End: 2018-02-05

## 2018-02-05 RX ADMIN — LISINOPRIL 10 MILLIGRAM(S): 2.5 TABLET ORAL at 06:02

## 2018-02-05 RX ADMIN — Medication 81 MILLIGRAM(S): at 12:17

## 2018-02-05 NOTE — DISCHARGE NOTE ADULT - MEDICATION SUMMARY - MEDICATIONS TO TAKE
I will START or STAY ON the medications listed below when I get home from the hospital:    Ecotrin Adult Low Strength 81 mg oral delayed release tablet  -- 1 tab(s) by mouth once a day  -- Indication: For Heart protection    lisinopril 10 mg oral tablet  -- 1 tab(s) by mouth once a day  -- Indication: For Hypertension    Trulicity Pen  -- 1 dose(s) subcutaneous once a week  -- Indication: For DM2 (diabetes mellitus, type 2)

## 2018-02-05 NOTE — CONSULT NOTE ADULT - SUBJECTIVE AND OBJECTIVE BOX
Patient is a 62y old  Male who presents with a chief complaint of chest pain    HPI:    63 yo male PMHx of DM and HTN came to the [facility] Emergency Department for chest pain x 2 days. He describes his chest pain as midsternal, 5/10, radiating to left arm, non-pleuritic and intermittent in nature where each episode lasts about 1 hr. No alleviating or aggravating factors identified. Never had this pain before. Pt denies fever, chills, diaphoresis, SOB, palpitations, nausea, vomiting or abdominal pain. Last stress test reported 2 yrs ago normal.     PAST MEDICAL & SURGICAL HISTORY:  Diabetes  Hypertension  No significant past surgical history      Review of Systems:   CONSTITUTIONAL: No fever, weight loss, or fatigue  EYES: No eye pain, visual disturbances, or discharge  ENMT:  No difficulty hearing, tinnitus, vertigo; No sinus or throat pain  NECK: No pain or stiffness  RESPIRATORY: No cough, wheezing, chills or hemoptysis; No shortness of breath  CARDIOVASCULAR: see above HPI  GASTROINTESTINAL: No abdominal or epigastric pain. No nausea, vomiting, or hematemesis; No diarrhea or constipation. No melena or hematochezia.  GENITOURINARY: No dysuria, frequency, hematuria, or incontinence  NEUROLOGICAL: No headaches, memory loss, loss of strength, numbness, or tremors  SKIN: No itching, burning, rashes, or lesions   LYMPH NODES: No enlarged glands  ENDOCRINE: No heat or cold intolerance; No hair loss  MUSCULOSKELETAL: No joint pain or swelling; No muscle, back, or extremity pain  PSYCHIATRIC: No depression, anxiety, mood swings, or difficulty sleeping  HEME/LYMPH: No easy bruising, or bleeding gums  ALLERGY AND IMMUNOLOGIC: No hives or eczema    Allergies    No Known Allergies    Intolerances  none        Social History: nonsmoker  no IVDA  lives with family  retired   ETOH 1 - 2 /week CAGE negative     FAMILY HISTORY:  No pertinent family history      MEDICATIONS  (STANDING):  aspirin enteric coated 81 milliGRAM(s) Oral daily  influenza   Vaccine 0.5 milliLiter(s) IntraMuscular once  lisinopril 10 milliGRAM(s) Oral daily    MEDICATIONS  (PRN):      CAPILLARY BLOOD GLUCOSE      POCT Blood Glucose.: 113 mg/dL (05 Feb 2018 09:56)  POCT Blood Glucose.: 139 mg/dL (04 Feb 2018 22:41)    I&O's Summary      PHYSICAL EXAM:  GENERAL: NAD, well-developed  HEAD:  Atraumatic, Normocephalic  EYES: EOMI, PERRLA, conjunctiva and sclera clear  NECK: Supple, No JVD  CHEST/LUNG: Clear to auscultation bilaterally; No wheeze  HEART: S1S2; No rubs, or gallops, no murmurs  ABDOMEN: Soft, Nontender, Nondistended; Bowel sounds present  EXTREMITIES:  Peripheral Pulses, No clubbing or cyanosis, no edema  PSYCH: AO x 3,   NEUROLOGY: Alert, no focal motor or sensory deficits  SKIN: No rashes or lesions    LABS:                        14.8   5.72  )-----------( 173      ( 05 Feb 2018 06:38 )             42.9     02-05    140  |  101  |  21  ----------------------------<  137<H>  4.3   |  28  |  1.23    Ca    8.8      05 Feb 2018 06:38    TPro  7.2  /  Alb  4.4  /  TBili  0.2  /  DBili  x   /  AST  30  /  ALT  33  /  AlkPhos  55  02-04      CARDIAC MARKERS ( 05 Feb 2018 06:38 )  x     / x     / 205 u/L / 2.20 ng/mL / x      CARDIAC MARKERS ( 04 Feb 2018 17:12 )  x     / < 0.06 ng/mL / 217 u/L / 2.87 ng/mL / x      CARDIAC MARKERS ( 04 Feb 2018 06:45 )  x     / < 0.06 ng/mL / 218 u/L / 3.09 ng/mL / x      CARDIAC MARKERS ( 04 Feb 2018 02:30 )  x     / < 0.06 ng/mL / 237 u/L / 3.42 ng/mL / x              RADIOLOGY & ADDITIONAL TESTS:    Consultant(s) Notes Reviewed:      Care Discussed with Consultants/Other Providers:

## 2018-02-05 NOTE — DISCHARGE NOTE ADULT - PLAN OF CARE
Follow up with primary doctor Please follow up with your primary doctor in 1-2 weeks. Continue your medications as prescribed. Continue medications as currently prescribed. Follow up with your PMD for further management of disease. Dash diet. Continue diet modification. Avoid complex carbohydrates such as bread, pasta, cereal, white rice, white potatoes, etc. Avoid concentrated sugar as found in desserts, candy, soda, juice, etc. Consume a diet based on lean protein (chicken, fish) and vegetables.

## 2018-02-05 NOTE — PROGRESS NOTE ADULT - SUBJECTIVE AND OBJECTIVE BOX
Subjective: patient seen/examined. no chest pain. NAD  	  MEDICATIONS:  MEDICATIONS  (STANDING):  aspirin enteric coated 81 milliGRAM(s) Oral daily  influenza   Vaccine 0.5 milliLiter(s) IntraMuscular once  lisinopril 10 milliGRAM(s) Oral daily      LABS:	 	    CARDIAC MARKERS:  CARDIAC MARKERS ( 05 Feb 2018 06:38 )  x     / x     / 205 u/L / 2.20 ng/mL / x      CARDIAC MARKERS ( 04 Feb 2018 17:12 )  x     / < 0.06 ng/mL / 217 u/L / 2.87 ng/mL / x      CARDIAC MARKERS ( 04 Feb 2018 06:45 )  x     / < 0.06 ng/mL / 218 u/L / 3.09 ng/mL / x      CARDIAC MARKERS ( 04 Feb 2018 02:30 )  x     / < 0.06 ng/mL / 237 u/L / 3.42 ng/mL / x                                    14.8   5.72  )-----------( 173      ( 05 Feb 2018 06:38 )             42.9     Hemoglobin: 14.8 g/dL (02-05 @ 06:38)  Hemoglobin: 14.5 g/dL (02-04 @ 02:30)      02-05    140  |  101  |  21  ----------------------------<  137<H>  4.3   |  28  |  1.23    Ca    8.8      05 Feb 2018 06:38    TPro  7.2  /  Alb  4.4  /  TBili  0.2  /  DBili  x   /  AST  30  /  ALT  33  /  AlkPhos  55  02-04    Creatinine Trend: 1.23<--, 1.48<--    TSH: Thyroid Stimulating Hormone, Serum: 0.83 uIU/mL (02-05 @ 06:38)        PHYSICAL EXAM:  T(C): 36.7 (02-05-18 @ 12:08), Max: 36.9 (02-05-18 @ 03:43)  HR: 74 (02-05-18 @ 16:43) (62 - 74)  BP: 149/75 (02-05-18 @ 16:43) (111/68 - 149/75)  RR: 18 (02-05-18 @ 16:43) (16 - 19)  SpO2: 98% (02-05-18 @ 16:43) (98% - 100%)  Wt(kg): --  I&O's Summary        HEENT:   Normal oral mucosa, PERRL, EOMI	  Lymphatic: No obvious lymphadenopathy , no edema  Cardiovascular: Normal S1 S2, No JVD, 1/6 ADEBAYO murmur, Peripheral pulses palpable 2+ bilaterally  Respiratory: Lungs clear to auscultation, normal effort 	  Gastrointestinal:  Soft, Non-tender, + BS	  Skin: No rashes,  No cyanosis, warm to touch  Musculoskeletal: Normal range of motion, normal strength  Psychiatry:  Appropriate Mood & affect     TELEMETRY: 	nsr    ECG:  	nsr/non  specific st/t changes  	      ASSESSMENT/PLAN: 	62y Male with pmhx of htn admitted with chest pain  1) no need for urgent cath  2) MI ruled out with negative CE  3) no evidence of unstable angina  4) echo to assess lv function  5) tst to assess ischemia  6) cath if echo or stress test abnormal

## 2018-02-05 NOTE — DISCHARGE NOTE ADULT - CARE PLAN
Principal Discharge DX:	Chest pain, unspecified type  Goal:	Follow up with primary doctor  Assessment and plan of treatment:	Please follow up with your primary doctor in 1-2 weeks. Continue your medications as prescribed.  Secondary Diagnosis:	Hypertension  Assessment and plan of treatment:	Continue medications as currently prescribed. Follow up with your PMD for further management of disease. Dash diet.  Secondary Diagnosis:	DM2 (diabetes mellitus, type 2)  Assessment and plan of treatment:	Continue diet modification. Avoid complex carbohydrates such as bread, pasta, cereal, white rice, white potatoes, etc. Avoid concentrated sugar as found in desserts, candy, soda, juice, etc. Consume a diet based on lean protein (chicken, fish) and vegetables.

## 2018-02-05 NOTE — PROGRESS NOTE ADULT - SUBJECTIVE AND OBJECTIVE BOX
EP ATTENDING    no palpitations, no syncope, no angina    aspirin enteric coated 81 milliGRAM(s) Oral daily  influenza   Vaccine 0.5 milliLiter(s) IntraMuscular once  lisinopril 10 milliGRAM(s) Oral daily                            14.8   5.72  )-----------( 173      ( 05 Feb 2018 06:38 )             42.9       02-05    140  |  101  |  21  ----------------------------<  137<H>  4.3   |  28  |  1.23    Ca    8.8      05 Feb 2018 06:38    TPro  7.2  /  Alb  4.4  /  TBili  0.2  /  DBili  x   /  AST  30  /  ALT  33  /  AlkPhos  55  02-04      CARDIAC MARKERS ( 05 Feb 2018 06:38 )  x     / x     / 205 u/L / 2.20 ng/mL / x      CARDIAC MARKERS ( 04 Feb 2018 17:12 )  x     / < 0.06 ng/mL / 217 u/L / 2.87 ng/mL / x      CARDIAC MARKERS ( 04 Feb 2018 06:45 )  x     / < 0.06 ng/mL / 218 u/L / 3.09 ng/mL / x      CARDIAC MARKERS ( 04 Feb 2018 02:30 )  x     / < 0.06 ng/mL / 237 u/L / 3.42 ng/mL / x            T(C): 36.7 (02-05-18 @ 12:08), Max: 36.9 (02-05-18 @ 03:43)  HR: 72 (02-05-18 @ 12:08) (62 - 72)  BP: 131/84 (02-05-18 @ 12:08) (111/68 - 131/84)  RR: 19 (02-05-18 @ 12:08) (16 - 19)  SpO2: 100% (02-05-18 @ 12:08) (98% - 100%)  Wt(kg): --    no JVD  RRR, no murmurs  CTAB  soft nt/nd  no c/c/e    echo - normal  NST - normal    A/P) He is a pleasant 61 y/o male PMH HTN and DM admitted with chest pain x 2 days. His EKG and enzymes x 3 are unremarkable. EP is called because of sinus bradycardia. His baseline QRS is narrow, and he denies symptoms of bradycardia denying syncope, presyncope, nor exercise intolerence. NST and echo pending.    -given no symptoms would not recommend PPM  -d/c home given normal echo and NST      Mimi Gonzales M.D., [facility]  Cardiac Electrophysiology  [facility]  [address] [place] [address], -[address] [place] [address]  [email or web address]    office [phone]  pager [phone]

## 2018-02-05 NOTE — CONSULT NOTE ADULT - PROBLEM SELECTOR RECOMMENDATION 4
unclear etiology   ? poor po intake  will send UA to check for protein  unlikely diabetic nephropathy  outpatient follow up with PCP for possible referral to nephrologist

## 2018-02-05 NOTE — CONSULT NOTE ADULT - ASSESSMENT
61 yo male PMH of DM and HTN came to the [facility] Emergency Department for chest pain x 2 days, cardiac enzymes negative so far

## 2018-02-05 NOTE — DISCHARGE NOTE ADULT - HOSPITAL COURSE
61 yo male PMHx of DM and HTN p/w chest pain x 2 days. Chest pain described as midsternal ache, 5/10, radiating to left arm, non-pleuritic and intermittent in nature where each episode lasts about 1 hr. No alleviating or aggravating factors identified. Never had this pain before. Pt denies fever, chills, diaphoresis, SOB, palpitations, nausea, vomiting or abdominal pain. Last stress test reported 2 yrs ago normal. (04 Feb 2018 07:41)    On admission EKG NSR @ 61 and serial cardiac enzyme negative to rule out ACS. CXR was clear. Transthoracic echocardiogram performed showing  mild MR, normal LV function. Stress test revealed ... 63 yo male PMHx of DM and HTN p/w chest pain x 2 days. Chest pain described as midsternal ache, 5/10, radiating to left arm, non-pleuritic and intermittent in nature where each episode lasts about 1 hr. No alleviating or aggravating factors identified. Never had this pain before. Pt denies fever, chills, diaphoresis, SOB, palpitations, nausea, vomiting or abdominal pain. Last stress test reported 2 yrs ago normal. (04 Feb 2018 07:41)    On admission EKG NSR @ 61 and serial cardiac enzyme negative to rule out ACS. CXR was clear. Transthoracic echocardiogram performed showing  mild MR, normal LV function. Stress test revealed normal study. Case was reviewed and cleared for discharge.

## 2018-02-05 NOTE — DISCHARGE NOTE ADULT - CARE PROVIDER_API CALL
Desiree Singh), Cardiovascular Disease; Internal Medicine  [address] [facility] [address] [place] [address]  Phone: [phone]  Fax: [phone]

## 2018-02-05 NOTE — CONSULT NOTE ADULT - ATTENDING COMMENTS
discussed with patient in detail, all questions answered  Thank you for the consultation request. I will follow with you

## 2018-02-05 NOTE — DISCHARGE NOTE ADULT - PATIENT PORTAL LINK FT
You can access the [facility] Patient Portal, offered by [facility], by registering with the following website: [email or web address]

## 2018-07-27 PROBLEM — Z78.9 ALCOHOL USE: Status: ACTIVE | Noted: 2017-01-05

## 2019-09-02 PROBLEM — Z09 FOLLOW UP: Status: ACTIVE | Noted: 2017-01-05

## 2019-11-27 PROBLEM — Z28.21 INFLUENZA VACCINATION DECLINED: Status: RESOLVED | Noted: 2017-01-05 | Resolved: 2019-11-27

## 2020-01-13 ENCOUNTER — EMERGENCY (EMERGENCY)
Facility: HOSPITAL | Age: 65
LOS: 1 days | Discharge: HOME | End: 2020-01-13
Attending: EMERGENCY MEDICINE | Admitting: EMERGENCY MEDICINE
Payer: COMMERCIAL

## 2020-01-13 VITALS
SYSTOLIC BLOOD PRESSURE: 166 MMHG | RESPIRATION RATE: 17 BRPM | TEMPERATURE: 98 F | HEART RATE: 84 BPM | OXYGEN SATURATION: 100 % | DIASTOLIC BLOOD PRESSURE: 80 MMHG

## 2020-01-13 VITALS
OXYGEN SATURATION: 100 % | TEMPERATURE: 98 F | HEART RATE: 86 BPM | RESPIRATION RATE: 16 BRPM | SYSTOLIC BLOOD PRESSURE: 158 MMHG | DIASTOLIC BLOOD PRESSURE: 82 MMHG

## 2020-01-13 PROCEDURE — 99282 EMERGENCY DEPT VISIT SF MDM: CPT

## 2020-01-13 RX ORDER — IBUPROFEN 200 MG
600 TABLET ORAL ONCE
Refills: 0 | Status: COMPLETED | OUTPATIENT
Start: 2020-01-13 | End: 2020-01-13

## 2020-01-13 RX ORDER — ACETAMINOPHEN 500 MG
650 TABLET ORAL ONCE
Refills: 0 | Status: COMPLETED | OUTPATIENT
Start: 2020-01-13 | End: 2020-01-13

## 2020-01-13 RX ADMIN — Medication 600 MILLIGRAM(S): at 12:38

## 2020-01-13 RX ADMIN — Medication 650 MILLIGRAM(S): at 12:38

## 2020-01-13 NOTE — ED PROVIDER NOTE - OBJECTIVE STATEMENT
64M pmhx of DM p/w left upper tooth ache x 3 weeks. Described as achy pain that has been persistent. Went to primary dentist, has root canal pending 1/23. He couldn't bear the pain, and decided to come into the ED to get it done sooner. Has been able to eat/drink and tolerate oral intake. Treated w/ ibuprofen at home w/ mild relief. Denies n/v, neck pain, visual complaints, headaches, abdominal pain, chest pain, sob. 64M pmhx of DM p/w left upper tooth ache x 3 weeks. Described as achy pain that has been persistent. Went to primary dentist, has root canal pending 1/23. He couldn't bear the pain, and decided to come into the ED to get it done sooner. Has been able to eat/drink and tolerate oral intake. Treated w/ ibuprofen at home w/ mild relief. Denies fevers, bleeding or drainage from mouth, n/v, neck pain, visual complaints, headaches, abdominal pain, chest pain, sob.

## 2020-01-13 NOTE — ED PROVIDER NOTE - ENMT, MLM
Airway patent, Nasal mucosa clear. Mouth with normal mucosa. Throat has no vesicles, no oropharyngeal exudates and uvula is midline. ORAL: (+) multiple teeth missing from upper and lower gums. (+) tenderness to percussion of left upper tooth. (-) abscess or purulent drainage seen, and (-) swelling of gums, (-) bleeding, (-) overlying cellulitis on left cheek.

## 2020-01-13 NOTE — ED PROVIDER NOTE - PROGRESS NOTE DETAILS
chely ponce fellow: dental evaluated patient in ed, patient called dental clinic for scheduling beginning of root canal tomorrow. Discussed return precautions and home tx w/ tylenol and ibuprofen. he is well appearing, hemodynamically stable, mentating well, ambulatory for dc home

## 2020-01-13 NOTE — ED ADULT TRIAGE NOTE - CHIEF COMPLAINT QUOTE
Co right upper dental pain x "few weeks". States he is scheduled for root canal on 1/23/2020 but states he "cannot take the pain". Taking ibuprofen with minimal relief.

## 2020-01-13 NOTE — ED PROVIDER NOTE - PATIENT PORTAL LINK FT
You can access the FollowMyHealth Patient Portal offered by [facility] by registering at the following website: [email or web address]. By joining [email or web address]’s FollowMyHealth portal, you will also be able to view your health information using other applications (apps) compatible with our system.

## 2020-01-13 NOTE — ED PROVIDER NOTE - CLINICAL SUMMARY MEDICAL DECISION MAKING FREE TEXT BOX
hx of dm htn p/w left upper toothache, x 3 weeks, due for a root canal 1/23 but persistent pain. hemodynamically stable, mentating well, ambulatory, tolerating oral intake, in no distress. Denies neck pain, headaches, visual complaints, neuro deficits. dental consult, pain control. dispo hx of dm htn p/w left upper toothache, x 3 weeks, due for a root canal 1/23 but persistent pain. hemodynamically stable, mentating well, ambulatory, tolerating oral intake, in no distress. Denies neck pain, headaches, visual complaints, neuro deficits. dental consult, pain control. dispo.

## 2020-01-13 NOTE — PROGRESS NOTE ADULT - SUBJECTIVE AND OBJECTIVE BOX
Patient is a 64y old  Male who presents with a chief complaint of dental pain     HPI: 64M pmhx of DM p/w left upper tooth ache x 3 weeks. Described as achy pain that has been persistent. Went to primary dentist, has root canal pending 1/23 for tooth #2 (patient points to tooth #2 to when asked where the pain comes from). He couldn't bear the pain, and decided to come into the ED to get it done sooner. Has been able to eat/drink and tolerate oral intake. Treated w/ ibuprofen at home w/ mild relief. Denies n/v, neck pain, visual complaints, headaches, abdominal pain, chest pain, sob. Patient states he was told by his outside dentist to present to the ED for RCT. He states he feels pain mainly between the teeth when food gets stuck there, and endorses hot and cold sensitivity. Patient was administered pain medication prior to dental consultation (pt did not report what the med was), and stated it helped his pain significantly so he was not in acute pain at time of dental consult.     PAST MEDICAL & SURGICAL HISTORY:  Diabetes  Hypertension  No significant past surgical history    MEDICATIONS  (STANDING):  MEDICATIONS  (PRN):    Allergies  No Known Allergies  Intolerances    FAMILY HISTORY:  No pertinent family history in first degree relatives    Last Dental Visit: cristian a year, patient has appt for RCT 1/23/2020    Vital Signs Last 24 Hrs  T(C): 36.9 (13 Jan 2020 14:55), Max: 36.9 (13 Jan 2020 14:55)  T(F): 98.4 (13 Jan 2020 14:55), Max: 98.4 (13 Jan 2020 14:55)  HR: 86 (13 Jan 2020 14:55) (84 - 86)  BP: 158/82 (13 Jan 2020 14:55) (158/82 - 166/80)  BP(mean): --  RR: 16 (13 Jan 2020 14:55) (16 - 17)  SpO2: 100% (13 Jan 2020 14:55) (100% - 100%)    EOE:  TMJ ( -  ) clicks                    (  -  ) pops                    (  -  ) crepitus             Mandible FROM             Facial bones and MOM grossly intact             ( -  ) trismus             ( -  ) LAD             ( -  ) swelling             ( -  ) asymmetry             ( -  ) palpation             ( -  ) SOB             ( -  ) dysphagia             ( -  ) LOC    IOE:   Permanent dentition. Multiple missing teeth. Patient wears maxillary and mandibular RPDs. No acute swelling, fistula, erythema, or other signs of acute infection noted.            hard/soft palate:  ( -  ) palatal torus           tongue/FOM WNL           labial/buccal mucosa WNL           ( +  ) percussion - blunted response, likely due to pain medication administration           ( + ) palpation mildly palpation + around tooth #2            ( -  ) swelling            ( + ) caries #2 distal            ( -  ) mobility     DENTAL RADIOGRAPHS: PA and BWX UR taken and interpreted. Findings: tooth #2 large MO and O restorations, large distal caries approximating pulp chamber. #30 D caries. No PARL noted.     ASSESSMENT:  Symptomatic irreversible pulpitis #2     PROCEDURE:  Verbal  consent given. EOE, IOE rads. Reviewed radiographic and clinical findings with patient, advised pain due to symptomatic irreversible pulpitis tooth #2. Advised that no signs of acute odontogenic infection noted at this time so no emergent treatment indicated in the ED setting. Advised that tooth #2 would require definitive treatment, either RCT or exo, neither of which we can offer at the ED. Advised patient to attempt to get in earlier with his outpatient dentist for definitive treatment. If patient unable to, patient can call [facility] to schedule emergency appt for emergency palliative treatment, likely pulpotomy tooth #2. Emphasized that any emergent treatment in the [facility] would also be temporary to relieve some pain, but that patient must maintain his appt with his outpatient dentist and continue for comprehensive dental care.     RECOMMENDATIONS:  1) Dental f/u with outpatient dentist or call [facility] [phone] to schedule emergency treatment for temporary palliative treatment   2) Dental F/U with outpatient dentist for comprehensive dental care.   3) If any difficulty swallowing/breathing, fever occur, return to ED     Mary Perkins DMD and Kervin Carmichael DDS   Dental Pager [phone]

## 2020-01-13 NOTE — ED PROVIDER NOTE - CONSTITUTIONAL, MLM
normal... Well appearing, awake, alert, oriented to person, place, time/situation and in no apparent distress. (+) dentures

## 2020-01-13 NOTE — ED PROVIDER NOTE - ATTENDING CONTRIBUTION TO CARE
TAVON King: I have personally performed a face to face bedside history and physical examination of this patient. I have discussed the history, examination, review of systems, assessment and plan of management with the resident. I have reviewed the electronic medical record and amended it to reflect my history, review of systems, physical exam, assessment and plan.

## 2020-01-13 NOTE — ED PROVIDER NOTE - NSFOLLOWUPINSTRUCTIONS_ED_ALL_ED_FT
Return to the emergency room if you experience worsening symptoms, fevers, vomiting, unable to tolerate oral intake, weakness, or new concerning symptoms.    Follow up with your primary care doctor in 1-2 days.    follow up with dental as scheduled tomorrow.    take ibuprofen 600 mg orally every 6-8 hours for pain control w/ food to avoid upset stomach. May add tylenol or acetaminophen 650 mg orally every 6-8 hours for additional pain control.

## 2020-12-15 PROBLEM — Z12.11 ENCOUNTER FOR SCREENING COLONOSCOPY: Status: RESOLVED | Noted: 2017-01-05 | Resolved: 2020-12-15

## 2021-03-15 NOTE — ED PROVIDER NOTE - NSTIMEPROVIDERCAREINITIATE_GEN_ER
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  See 's notes for details.      -Zio patch 24 hour turned in.  Had symptoms which are typical for her of lightheadedness with position changes.    -OCP for menstrual cramping but still feels tired and weak during menses thru entire time of withdrawal bleeds every month.  Eats spinach.  No red meat.  Eats fish and chicken.  -Never taken a iron supplement  -No further presyncopal epiisodes  -Doesn't like to eat before swim practice due to bothering her stomach  -Menses on this OCP have similar flow to off of OCPs.    -Her father who is a medic thinks her symptoms could be from low iron.   -She doesn't limit salt. Eats salty foods as well and adds slat to some of her foods.     Current Outpatient Medications   Medication     desogestrel-ethinyl estradiol (APRI) 0.15-30 MG-MCG tablet     No current facility-administered medications for this visit.          O: NAD  /77   Pulse 78   Ht 1.753 m (5' 9\")   Wt 66.6 kg (146 lb 12.8 oz)   LMP 03/01/2021 (Approximate)   BMI 21.68 kg/m          Results for WILBUR AMAYA (MRN [id number]) as of 3/15/2021 10:34   Ref. Range 3/3/2021 16:44 3/4/2021 10:30 3/5/2021 14:59 3/5/2021 16:04   Sodium Latest Ref Range: 133 - 144 mmol/L   139    Potassium Latest Ref Range: 3.4 - 5.3 mmol/L   4.2    Chloride Latest Ref Range: 96 - 110 mmol/L   104    Carbon Dioxide Latest Ref Range: 20 - 32 mmol/L   28    Urea Nitrogen Latest Ref Range: 7 - 30 mg/dL   19    Creatinine Latest Ref Range: 0.50 - 1.00 mg/dL   0.77    GFR Estimate Latest Ref Range: >60 mL/min/1.73_m2   >90    GFR Estimate If Black Latest Ref Range: >60 mL/min/1.73_m2   >90    Calcium Latest Ref Range: 8.5 - 10.1 mg/dL   9.3    Anion Gap Latest Ref Range: 3 - 14 mmol/L   8    Albumin Latest Ref Range: 3.4 - 5.0 g/dL   3.4    Protein Total Latest Ref Range: 6.8 - 8.8 g/dL   7.5    Bilirubin Total Latest Ref Range: " 0.2 - 1.3 mg/dL   0.3    Alkaline Phosphatase Latest Ref Range: 40 - 150 U/L   60    ALT Latest Ref Range: 0 - 50 U/L   24    AST Latest Ref Range: 0 - 35 U/L   18    Ferritin Latest Ref Range: 12 - 150 ng/mL 8 (L)      TSH Latest Ref Range: 0.40 - 4.00 mU/L   1.10    Vitamin D Deficiency screening Latest Ref Range: 20 - 75 ug/L   51    Glucose Latest Ref Range: 70 - 99 mg/dL   87    WBC Latest Ref Range: 4.0 - 11.0 10e9/L 5.7 Canceled, Test credited     Hemoglobin Latest Ref Range: 11.7 - 15.7 g/dL 13.3 Canceled, Test credited     Hematocrit Latest Ref Range: 35.0 - 47.0 % 41.1 Canceled, Test credited     Platelet Count Latest Ref Range: 150 - 450 10e9/L 257 Canceled, Test credited     RBC Count Latest Ref Range: 3.8 - 5.2 10e12/L 4.30 Canceled, Test credited     MCV Latest Ref Range: 78 - 100 fl 96 Canceled, Test credited     MCH Latest Ref Range: 26.5 - 33.0 pg 30.9 Canceled, Test credited     MCHC Latest Ref Range: 31.5 - 36.5 g/dL 32.4 Canceled, Test credited     RDW Latest Ref Range: 10.0 - 15.0 % 12.9 Canceled, Test credited     Diff Method Unknown Automated Method      % Neutrophils Latest Units: % 40.0      % Lymphocytes Latest Units: % 49.9      % Monocytes Latest Units: % 7.2      % Eosinophils Latest Units: % 1.8      % Basophils Latest Units: % 0.7      % Immature Granulocytes Latest Units: % 0.4      Nucleated RBCs Latest Ref Range: 0 /100 0      Absolute Neutrophil Latest Ref Range: 1.6 - 8.3 10e9/L 2.3      Absolute Lymphocytes Latest Ref Range: 0.8 - 5.3 10e9/L 2.8      Absolute Monocytes Latest Ref Range: 0.0 - 1.3 10e9/L 0.4      Absolute Eosinophils Latest Ref Range: 0.0 - 0.7 10e9/L 0.1      Absolute Basophils Latest Ref Range: 0.0 - 0.2 10e9/L 0.0      Abs Immature Granulocytes Latest Ref Range: 0 - 0.4 10e9/L 0.0      Absolute Nucleated RBC Unknown 0.0        Zio patch results pending.     Echo and EKG done in the Fall due to COVID RTP were normal.         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   Low ferritin without anemia  Fatigue during withdrawal bleeds on OCP as well as off OCPs.    P:  I have explained that her ferritin is low and not she is not anemic.  If the iron storage continues to decrease she won't be able to make RBC appropriately and she will become anemia and that can definitely effect her performance and may make her dizzy.  I doublt that a ferritin of 8 with a normal hemoglobin would cause that symptom of positional change associated dizziness and that is likely due a responsive vascular system coupled with low intravascular volume.      -She has tried to increase her green vegetables but her ferritin has remained low.   -She won't eat red meat  -Add Vitamin C food source with eating leafy green vegetables.  Start Slow FE one per day.  This will be given to her from [facility].  Discussed side effects.   -Talk with OB-Gyn Doctor about taking an OCP like Seasonique to have only 4 withdrawal bleeds per year vs 12.  This may limit her symptoms of fatigue during withdrawal bleeds as well as lessen iron loss.   -Discussed ways to increase volume of fluid.  She doesn't like plain water but drinks water with BCA and added electrolytes.  Encouraged use of Powerade or other similar product.  Meet with dietician to discuss options/ideas.      Recheck ferritin and hemoglobin in 3 months and have a f/u at that time.      40min of total time spent in one-on-one evalution and discussion with patient regarding nature of problem, course, prior treatments, and therapeutic options,> 50% of which was spent in counseling and coordination of care along with chart review and documentation.  :      Emilia Stout ATC was present for the entire appt/    Latonya Fong MD, CAQ, FACSM, CCD  [facility]  Sports Medicine and Bone Health  Team Physician;  Athletics           04-Feb-2018 01:57

## 2021-06-24 ENCOUNTER — APPOINTMENT (OUTPATIENT)
Dept: OPHTHALMOLOGY | Facility: CLINIC | Age: 66
End: 2021-06-24

## 2021-07-22 ENCOUNTER — NON-APPOINTMENT (OUTPATIENT)
Age: 66
End: 2021-07-22

## 2021-07-22 ENCOUNTER — APPOINTMENT (OUTPATIENT)
Dept: OPHTHALMOLOGY | Facility: CLINIC | Age: 66
End: 2021-07-22
Payer: COMMERCIAL

## 2021-07-22 PROCEDURE — 92004 COMPRE OPH EXAM NEW PT 1/>: CPT

## 2021-07-22 PROCEDURE — 92133 CPTRZD OPH DX IMG PST SGM ON: CPT

## 2021-07-22 PROCEDURE — 99072 ADDL SUPL MATRL&STAF TM PHE: CPT

## 2021-07-22 PROCEDURE — 92015 DETERMINE REFRACTIVE STATE: CPT

## 2021-07-22 PROCEDURE — 92020 GONIOSCOPY: CPT

## 2021-07-22 PROCEDURE — 76514 ECHO EXAM OF EYE THICKNESS: CPT

## 2021-09-24 ENCOUNTER — APPOINTMENT (OUTPATIENT)
Dept: OPHTHALMOLOGY | Facility: CLINIC | Age: 66
End: 2021-09-24

## 2022-05-26 ENCOUNTER — NON-APPOINTMENT (OUTPATIENT)
Age: 67
End: 2022-05-26

## 2022-05-27 ENCOUNTER — APPOINTMENT (OUTPATIENT)
Dept: OPHTHALMOLOGY | Facility: CLINIC | Age: 67
End: 2022-05-27
Payer: COMMERCIAL

## 2022-05-27 PROCEDURE — 92012 INTRM OPH EXAM EST PATIENT: CPT

## 2022-05-27 PROCEDURE — 92083 EXTENDED VISUAL FIELD XM: CPT

## 2022-05-27 PROCEDURE — 92020 GONIOSCOPY: CPT

## 2022-05-30 ENCOUNTER — EMERGENCY (EMERGENCY)
Facility: HOSPITAL | Age: 67
LOS: 1 days | Discharge: HOME | End: 2022-05-30
Admitting: STUDENT IN AN ORGANIZED HEALTH CARE EDUCATION/TRAINING PROGRAM
Payer: COMMERCIAL

## 2022-05-30 VITALS
TEMPERATURE: 97 F | SYSTOLIC BLOOD PRESSURE: 160 MMHG | WEIGHT: 181 LBS | HEIGHT: 66 IN | RESPIRATION RATE: 16 BRPM | HEART RATE: 75 BPM | OXYGEN SATURATION: 98 % | DIASTOLIC BLOOD PRESSURE: 69 MMHG

## 2022-05-30 PROCEDURE — 99284 EMERGENCY DEPT VISIT MOD MDM: CPT

## 2022-05-30 RX ORDER — KETOROLAC TROMETHAMINE 30 MG/ML
15 SYRINGE (ML) INJECTION ONCE
Refills: 0 | Status: DISCONTINUED | OUTPATIENT
Start: 2022-05-30 | End: 2022-05-30

## 2022-05-30 RX ORDER — LIDOCAINE 4 G/100G
1 CREAM TOPICAL ONCE
Refills: 0 | Status: COMPLETED | OUTPATIENT
Start: 2022-05-30 | End: 2022-05-30

## 2022-05-30 RX ORDER — ACETAMINOPHEN 500 MG
650 TABLET ORAL ONCE
Refills: 0 | Status: COMPLETED | OUTPATIENT
Start: 2022-05-30 | End: 2022-05-30

## 2022-05-30 RX ADMIN — LIDOCAINE 1 PATCH: 4 CREAM TOPICAL at 23:18

## 2022-05-30 RX ADMIN — Medication 15 MILLIGRAM(S): at 23:18

## 2022-05-30 RX ADMIN — Medication 650 MILLIGRAM(S): at 23:18

## 2022-05-30 NOTE — ED PROVIDER NOTE - OBJECTIVE STATEMENT
Pt is 67 y/o Male with PMH significant for DM, HTN, chronic back pain for 2 years, presenting with bilateral lower back pain for 3 weeks. Per patient, back pain has been chronic issue for last 2 year; Had epidural injection 6 weeks ago, and starting 3 weeks ago start to develop bilateral lower back pain, radiating down posterior thing with numbness to left big toe that worsening w/ movement, standing and ambulation. Pt denies taking pain medication as well as fever, chills, n/v/d, weakness, urinary/fecal incontinence, saddle anesthesia, recent weight change, and Injury/trauma.

## 2022-05-30 NOTE — ED PROVIDER NOTE - PATIENT PORTAL LINK FT
You can access the FollowMyHealth Patient Portal offered by [facility] by registering at the following website: [email or web address]. By joining [employer]’s FollowMyHealth portal, you will also be able to view your health information using other applications (apps) compatible with our system.

## 2022-05-30 NOTE — ED PROVIDER NOTE - CHPI ED SYMPTOMS NEG
No fever, chills, no nausea, no vomiting, no diarrhea, no weakness, no saddle anesthesia, no recent weight change/no bladder dysfunction/no bowel dysfunction/no motor function loss

## 2022-05-30 NOTE — ED ADULT TRIAGE NOTE - CHIEF COMPLAINT QUOTE
pt amb to triage c/o lower back pain b/l x 2 years radiating to b/l thighs, denies falls or trauma, + pedal pulses, + ROM in lower extremities, states pain worsened today and presented to ED for eval, Hx herniated disks, received injection 4-5 weeks ago

## 2022-05-30 NOTE — ED PROVIDER NOTE - NSFOLLOWUPINSTRUCTIONS_ED_ALL_ED_FT
Follow with your PMD within 48-72 hours.  Rest, no heavy lifting.  Warm compresses to area. Recommend your Ortho for follow up.  Light walking. Take Motrin 600 mg every 6-8 hours for pain with food, Take Flexeril 10 mg every 8 hours as needed for muscle spasm -- causes drowsiness; no drinking alcohol or driving with this medication. Any worsening pain, weakness, numbness, bowel or urinary incontinence or new concerning symptoms return to the Emergency Department.

## 2022-05-30 NOTE — ED PROVIDER NOTE - GASTROINTESTINAL NEGATIVE STATEMENT, MLM
no abdominal pain, no bloating, no constipation, no diarrhea, no nausea and no vomiting. No fecal incontinence

## 2022-05-30 NOTE — ED PROVIDER NOTE - CARDIAC, MLM
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 Postures (decerebrates) to mild pain and touch  No seizures reported        ASSESSMENT/PLAN:  Overall neurological status potentially calamitous.   Very poor prognosis  We will check EEG    ALLERGIES:    No Known Allergies    MEDS:      Current Facility-Administered Medications:     potassium, sodium phosphates (NEUTRA-PHOS) packet 2 Packet, 2 Packet, Oral, Q4H, Jame Erickson MD    levETIRAcetam (KEPPRA) 500 mg in saline (iso-osm) 100 mL IVPB (premix), 500 mg, IntraVENous, Q12H, Lupis Taylor MD, 500 mg at 08/05/21 0610    metoclopramide HCl (REGLAN) injection 10 mg, 10 mg, IntraVENous, Q4H PRN, Sangeeta Taylor MD    0.9% sodium chloride infusion, 100 mL/hr, IntraVENous, CONTINUOUS, Lupis Taylor MD, Last Rate: 100 mL/hr at 08/04/21 0855, 100 mL/hr at 08/04/21 0855    acetaminophen (TYLENOL) tablet 650 mg, 650 mg, Oral, Q6H PRN **OR** acetaminophen (TYLENOL) suppository 650 mg, 650 mg, Rectal, Q6H PRN, Sangeeta Taylor MD    polyethylene glycol (MIRALAX) packet 17 g, 17 g, Oral, DAILY PRN, Sangeeta Taylor MD    ondansetron (ZOFRAN ODT) tablet 4 mg, 4 mg, Oral, Q8H PRN **OR** ondansetron (ZOFRAN) injection 4 mg, 4 mg, IntraVENous, Q6H PRN, Lupis Taylor MD    enoxaparin (LOVENOX) injection 40 mg, 40 mg, SubCUTAneous, DAILY, Lupis Taylor MD, 40 mg at 08/05/21 0800    aspirin tablet 325 mg, 325 mg, Oral, DAILY, Lupis Taylor MD, 325 mg at 08/05/21 0800    atorvastatin (LIPITOR) tablet 40 mg, 40 mg, Oral, QHS, Luips Taylor MD, 40 mg at 08/04/21 2157    piperacillin-tazobactam (ZOSYN) 3.375 g in 0.9% sodium chloride (MBP/ADV) 100 mL MBP, 3.375 g, IntraVENous, Q8H, Lupis Taylor MD, Last Rate: 200 mL/hr at 08/05/21 0402, 3.375 g at 08/05/21 0402    propofol (DIPRIVAN) 10 mg/mL infusion, 0-50 mcg/kg/min, IntraVENous, TITRATE, Israel Fatima MD, Last Rate: 3.9 mL/hr at 08/04/21 1800, 10 mcg/kg/min at 08/04/21 1800    dexamethasone (DECADRON) 4 mg/mL injection 4 mg, 4 mg, IntraVENous, Q6H, Israel Fatima MD, 4 mg at 08/05/21 0610    NOREPINephrine (LEVOPHED) 8 mg in 5% dextrose 250mL (32 mcg/mL) infusion, 10 mcg/min, IntraVENous, TITRATE, Lupis Taylor MD, Last Rate: 9.4 mL/hr at 08/05/21 0755, 5 mcg/min at 08/05/21 0755    LABS:  Recent Results (from the past 24 hour(s))   CULTURE, BLOOD    Collection Time: 08/04/21 10:20 AM    Specimen: Blood   Result Value Ref Range    Special Requests: No Special Requests      Culture result: No growth after 19 hours     PROLACTIN    Collection Time: 08/04/21 10:20 AM   Result Value Ref Range    Prolactin 19.0 ng/mL   CULTURE, BLOOD    Collection Time: 08/04/21 10:25 AM    Specimen: Blood   Result Value Ref Range    Special Requests: No Special Requests      Culture result: No growth after 19 hours     URINALYSIS W/MICROSCOPIC    Collection Time: 08/04/21 11:00 AM   Result Value Ref Range    Color Yellow/Straw      Appearance Clear Clear      Specific gravity 1.025 1.003 - 1.030      pH (UA) 7.0 5.0 - 8.0      Protein 30 (A) Negative mg/dL    Glucose Negative Negative mg/dL    Ketone 5 (A) Negative mg/dL    Bilirubin Negative Negative      Blood Negative Negative      Urobilinogen 4.0 (H) 0.1 - 1.0 EU/dL    Nitrites Negative Negative      Leukocyte Esterase Negative Negative      WBC 0-5 0 - 4 /hpf    RBC 0-5 0 - 5 /hpf    Bacteria Negative Negative /hpf   DRUG SCREEN, URINE    Collection Time: 08/04/21 11:00 AM   Result Value Ref Range    AMPHETAMINES Negative Negative      BARBITURATES Negative Negative      BENZODIAZEPINES Negative Negative      COCAINE Negative Negative      METHADONE Negative Negative      OPIATES Negative Negative      PCP(PHENCYCLIDINE) Negative Negative      THC (TH-CANNABINOL) Negative Negative      Drug screen comment        This test is a screen for drugs of abuse in a medical setting only (i.e., they are unconfirmed results and as such must not be used for non-medical purposes, e.g.,employment testing, legal testing). Due to its inherent nature, false positive (FP) and false negative (FN) results may be obtained. Therefore, if necessary for medical care, recommend confirmation of positive findings by GC/MS. DRUG SCREEN, URINE    Collection Time: 08/04/21 11:00 AM   Result Value Ref Range    AMPHETAMINES Negative Negative      BARBITURATES Negative Negative      BENZODIAZEPINES Negative Negative      COCAINE Negative Negative      METHADONE Negative Negative      OPIATES Negative Negative      PCP(PHENCYCLIDINE) Negative Negative      THC (TH-CANNABINOL) Negative Negative      Drug screen comment        This test is a screen for drugs of abuse in a medical setting only (i.e., they are unconfirmed results and as such must not be used for non-medical purposes, e.g.,employment testing, legal testing). Due to its inherent nature, false positive (FP) and false negative (FN) results may be obtained. Therefore, if necessary for medical care, recommend confirmation of positive findings by GC/MS.    GLUCOSE, POC    Collection Time: 08/04/21  5:10 PM   Result Value Ref Range    Glucose (POC) 96 65 - 117 mg/dL    Performed by Carlos Enrique Latham    BLOOD GAS, ARTERIAL    Collection Time: 08/04/21  5:14 PM   Result Value Ref Range    pH 7.47 (H) 7.35 - 7.45      PCO2 31 (L) 35 - 45 mmHg    PO2 54 (L) 75 - 100 mmHg    O2 SAT 89 (L) >95 %    BICARBONATE 24 22 - 26 mmol/L    BASE DEFICIT 0.2 0 - 2 mmol/L    O2 METHOD VENT      FIO2 100.0 %    MODE Assist Control/Volume Control      Tidal volume 500      SET RATE 12      IPAP/PIP 0      EPAP/CPAP/PEEP 5.0      SITE Right Brachial      KRUNAL'S TEST PASS      Critical value read back DR NIDHI Swanson W/MICROSCOPIC    Collection Time: 08/04/21  6:41 PM   Result Value Ref Range    Color Yellow/Straw Appearance Clear Clear      Specific gravity 1.026 1.003 - 1.030      pH (UA) 6.0 5.0 - 8.0      Protein 30 (A) Negative mg/dL    Glucose 50 (A) Negative mg/dL    Ketone 5 (A) Negative mg/dL    Bilirubin Negative Negative      Blood Negative Negative      Urobilinogen 4.0 (H) 0.1 - 1.0 EU/dL    Nitrites Negative Negative      Leukocyte Esterase Negative Negative      WBC 0-4 0 - 4 /hpf    RBC 0-5 0 - 5 /hpf    Bacteria Negative Negative /hpf    Mucus Trace /lpf   MRSA SCREEN - PCR (NASAL)    Collection Time: 08/04/21  6:41 PM   Result Value Ref Range    MRSA by PCR, Nasal Not Detected Not Detected     TSH 3RD GENERATION    Collection Time: 08/04/21  7:25 PM   Result Value Ref Range    TSH 0.90 0.36 - 3.74 uIU/mL   LACTIC ACID    Collection Time: 08/04/21  7:25 PM   Result Value Ref Range    Lactic acid 5.5 (HH) 0.4 - 2.0 mmol/L   BNP    Collection Time: 08/04/21  7:25 PM   Result Value Ref Range    NT pro- (H) <125 pg/mL   TROPONIN I    Collection Time: 08/04/21  7:25 PM   Result Value Ref Range    Troponin-I, Qt. 0.38 (H) <9.24 ng/mL   METABOLIC PANEL, COMPREHENSIVE    Collection Time: 08/05/21  1:02 AM   Result Value Ref Range    Sodium 137 136 - 145 mmol/L    Potassium 3.9 3.5 - 5.1 mmol/L    Chloride 108 97 - 108 mmol/L    CO2 19 (L) 21 - 32 mmol/L    Anion gap 10 5 - 15 mmol/L    Glucose 119 (H) 65 - 100 mg/dL    BUN 22 (H) 6 - 20 mg/dL    Creatinine 1.32 (H) 0.70 - 1.30 mg/dL    BUN/Creatinine ratio 17 12 - 20      GFR est AA >60 >60 ml/min/1.73m2    GFR est non-AA 54 (L) >60 ml/min/1.73m2    Calcium 6.1 (LL) 8.5 - 10.1 mg/dL    Bilirubin, total 1.6 (H) 0.2 - 1.0 mg/dL    AST (SGOT) 84 (H) 15 - 37 U/L    ALT (SGPT) <6 (L) 12 - 78 U/L    Alk.  phosphatase 48 45 - 117 U/L    Protein, total 6.6 6.4 - 8.2 g/dL    Albumin 2.7 (L) 3.5 - 5.0 g/dL    Globulin 3.9 2.0 - 4.0 g/dL    A-G Ratio 0.7 (L) 1.1 - 2.2     RENAL FUNCTION PANEL    Collection Time: 08/05/21  1:02 AM   Result Value Ref Range    Sodium 136 136 - 145 mmol/L    Potassium 3.8 3.5 - 5.1 mmol/L    Chloride 107 97 - 108 mmol/L    CO2 23 21 - 32 mmol/L    Anion gap 6 5 - 15 mmol/L    Glucose 125 (H) 65 - 100 mg/dL    BUN 22 (H) 6 - 20 mg/dL    Creatinine 1.29 0.70 - 1.30 mg/dL    BUN/Creatinine ratio 17 12 - 20      GFR est AA >60 >60 ml/min/1.73m2    GFR est non-AA 56 (L) >60 ml/min/1.73m2    Calcium 8.3 (L) 8.5 - 10.1 mg/dL    Phosphorus 1.4 (L) 2.6 - 4.7 mg/dL    Albumin 2.7 (L) 3.5 - 5.0 g/dL   MAGNESIUM    Collection Time: 08/05/21  1:02 AM   Result Value Ref Range    Magnesium 1.9 1.6 - 2.4 mg/dL   TROPONIN I    Collection Time: 08/05/21  1:02 AM   Result Value Ref Range    Troponin-I, Qt. 1.38 (H) <0.05 ng/mL   AMMONIA    Collection Time: 08/05/21  1:02 AM   Result Value Ref Range    Ammonia 37 (H) <32 umol/L   BLOOD GAS, ARTERIAL    Collection Time: 08/05/21  4:00 AM   Result Value Ref Range    pH 7.45 7.35 - 7.45      PCO2 37 35 - 45 mmHg    PO2 134 (H) 75 - 100 mmHg    O2 SAT 98 >95 %    BICARBONATE 26 22 - 26 mmol/L    BASE EXCESS 1.8 0 - 2 mmol/L    O2 METHOD VENT      FIO2 80.0 %    MODE Assist Control/Volume Control      Tidal volume 500      SET RATE 12      IPAP/PIP 0      EPAP/CPAP/PEEP 5.0      SITE Right Radial      KRUNAL'S TEST PASS     CBC WITH AUTOMATED DIFF    Collection Time: 08/05/21  4:15 AM   Result Value Ref Range    WBC 13.3 (H) 4.1 - 11.1 K/uL    RBC 4.79 4.10 - 5.70 M/uL    HGB 14.5 12.1 - 17.0 g/dL    HCT 43.4 36.6 - 50.3 %    MCV 90.6 80.0 - 99.0 FL    MCH 30.3 26.0 - 34.0 PG    MCHC 33.4 30.0 - 36.5 g/dL    RDW 13.1 11.5 - 14.5 %    PLATELET 172 999 - 451 K/uL    MPV 11.9 8.9 - 12.9 FL    NRBC 0.0 0.0  WBC    ABSOLUTE NRBC 0.00 0.00 - 0.01 K/uL    NEUTROPHILS 66 32 - 75 %    BAND NEUTROPHILS 23 (H) 0 - 6 %    LYMPHOCYTES 5 (L) 12 - 49 %    MONOCYTES 6 5 - 13 %    EOSINOPHILS 0 0 - 7 %    BASOPHILS 0 0 - 1 %    IMMATURE GRANULOCYTES 0 %    ABS. NEUTROPHILS 11.8 (H) 1.8 - 8.0 K/UL    ABS.  LYMPHOCYTES 0.7 (L) 0.8 - 3.5 K/UL ABS. MONOCYTES 0.8 0.0 - 1.0 K/UL    ABS. EOSINOPHILS 0.0 0.0 - 0.4 K/UL    ABS. BASOPHILS 0.0 0.0 - 0.1 K/UL    ABS. IMM. GRANS. 0.0 K/UL    DF Manual      RBC COMMENTS Normocytic, Normochromic         Visit Vitals  /80 (BP 1 Location: Left upper arm, BP Patient Position: At rest)   Pulse (!) 113   Temp 99.9 °F (37.7 °C)   Resp 20   Ht 5' 5\" (1.651 m)   Wt 65.8 kg (145 lb)   SpO2 100%   BMI 24.13 kg/m²       Imaging:  XR CHEST PORT   Final Result      XR CHEST PORT   Final Result   ET tube placed, with tip 7 mm above the doretha. Asymmetric airspace   disease noted throughout the left lung, consistent with pneumonia, improved from   previous. Right lung clear. Stable normal heart size. Calcified aortic knob. XR CHEST PORT   Final Result   New diffuse airspace opacity in the left lung. MRI BRAIN WO CONT   Final Result   1. Restricted diffusion involving the cerebellar hemispheres, nemesio, and   possibly the left midbrain most likely representing acute ischemic infarcts   although the brainstem involvement may be partially artifactual. Consider a   short-term interval follow-up MRI. 2.  Attenuated vertebrobasilar flow voids which may be related to underlying   stenosis. CTA or MRA is recommended for further evaluation. 3.  Extensive multifocal encephalomalacia as described above. Report called [facility] the [facility] at 5:45 PM on   8/4/2021 via phone conversation. XR CHEST PORT   Final Result   The cardiomediastinal silhouette is appropriate for age, technique,   and lung expansion. Pulmonary vasculature is not congested. The lungs are   essentially clear. No effusion or pneumothorax is seen. CT CODE NEURO HEAD WO CONTRAST   Final Result      No acute intracranial bleed. Bilateral areas of encephalomalacia. Atherosclerosis. A noncontrast head CT does not exclude the possibility of an   acute ischemic stroke.  MRI is more sensitive for evaluation of brain parenchyma. Clinical correlation is recommended. Follow-up as clinically indicated. The   results were called and verbally communicated to Dr. Coleen Aguilera on 8/4/2021 at   1:00 AM.      Dental disease. Please see full report.       DUPLEX CAROTID BILATERAL    (Results Pending)   XR CHEST PORT    (Results Pending) Normal rate, regular rhythm.  Heart sounds S1, S2.  No murmurs, rubs or gallops.

## 2022-05-30 NOTE — ED PROVIDER NOTE - CLINICAL SUMMARY MEDICAL DECISION MAKING FREE TEXT BOX
Pt is 67 y/o Male with PMH significant for DM, HTN, chronic back pain for 2 years, presenting with bilateral lower back pain for 3 weeks ago. Concern for Disc herniation. Pt declined surgical Option. No Red Flags, Pt ambulatory. Will provide pain control, lidocaine, Tylenol and Toradol. Warm compress for Sx care, F/u ortho outpatient.

## 2022-05-30 NOTE — ED PROVIDER NOTE - NEUROLOGICAL, MLM
Alert and oriented, no focal deficits, no motor deficits; Negative foot drop. Decrease sensory in left big toe.

## 2022-05-30 NOTE — ED PROVIDER NOTE - PROGRESS NOTE DETAILS
PA JULIO: Patient reassessed, sitting comfortably in chair in NAD, denies any complaints. States feeling better, symptoms improved. Pt is medically stable for discharge and follow up with PMD & ortho. The patient was given verbal and written discharge instructions. Specifically, instructions when to return to the ED and when to seek follow-up from their pcp was discussed. Any specialty follow-up was discussed, including how to make an appointment.  Instructions were discussed in simple, plain language and was understood by the patient. The patient understands that should their symptoms worsen or any new symptoms arise, they should return to the ED immediately for further evaluation. All pt's questions were answered. Patient verbalizes understanding.

## 2022-05-31 RX ORDER — CYCLOBENZAPRINE HYDROCHLORIDE 10 MG/1
1 TABLET, FILM COATED ORAL
Qty: 21 | Refills: 0
Start: 2022-05-31 | End: 2022-06-06

## 2022-07-20 ENCOUNTER — EMERGENCY (EMERGENCY)
Facility: HOSPITAL | Age: 67
LOS: 0 days | Discharge: HOME | End: 2022-07-21
Attending: STUDENT IN AN ORGANIZED HEALTH CARE EDUCATION/TRAINING PROGRAM

## 2022-07-20 VITALS
HEART RATE: 67 BPM | OXYGEN SATURATION: 95 % | RESPIRATION RATE: 16 BRPM | SYSTOLIC BLOOD PRESSURE: 137 MMHG | WEIGHT: 179.9 LBS | DIASTOLIC BLOOD PRESSURE: 83 MMHG | TEMPERATURE: 98 F | HEIGHT: 66 IN

## 2022-07-20 DIAGNOSIS — I10 ESSENTIAL (PRIMARY) HYPERTENSION: ICD-10-CM

## 2022-07-20 DIAGNOSIS — E11.9 TYPE 2 DIABETES MELLITUS WITHOUT COMPLICATIONS: ICD-10-CM

## 2022-07-20 DIAGNOSIS — R51.9 HEADACHE, UNSPECIFIED: ICD-10-CM

## 2022-07-20 DIAGNOSIS — Z79.84 LONG TERM (CURRENT) USE OF ORAL HYPOGLYCEMIC DRUGS: ICD-10-CM

## 2022-07-20 PROCEDURE — 99284 EMERGENCY DEPT VISIT MOD MDM: CPT

## 2022-07-20 RX ORDER — ACETAMINOPHEN 500 MG
650 TABLET ORAL ONCE
Refills: 0 | Status: COMPLETED | OUTPATIENT
Start: 2022-07-20 | End: 2022-07-20

## 2022-07-20 NOTE — ED PROVIDER NOTE - NSFOLLOWUPINSTRUCTIONS_ED_ALL_ED_FT
Take over the counter acetaminophen or ibuprofen as needed for pain. Follow the instructions on the bottle. followup with your primary care provider as scheduled or in two weeks if not better. You can ice the area for 20 minutes every 3 hours as needed for pain.

## 2022-07-20 NOTE — ED PROVIDER NOTE - OBJECTIVE STATEMENT
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 Worried skull might be broken. No loc, no other symptoms, no nausea, vomiting, amnesia, numbness or weakness 65 yo man, [facility] presents with left parietal ache, sharp, mild to moderate, constant after struck his head while he was under a bus and hit his head on the frame while he tried to stand up. Worried skull might be broken. No loc, no other symptoms, no nausea, vomiting, amnesia, numbness or weakness. Has not taken any medication for the pain.

## 2022-07-20 NOTE — ED ADULT TRIAGE NOTE - AS TEMP SITE
Additional Note: Left flank
Detail Level: Detailed
Hide Include Location In Plan Question?: No
no apparent risk to self
oral

## 2022-07-20 NOTE — ED PROVIDER NOTE - PATIENT PORTAL LINK FT
You can access the FollowMyHealth Patient Portal offered by [facility] by registering at the following website: [email or web address]. By joining [facility]’s FollowMyHealth portal, you will also be able to view your health information using other applications (apps) compatible with our system.

## 2022-07-20 NOTE — ED ADULT TRIAGE NOTE - CHIEF COMPLAINT QUOTE
pt states he hit his head underneath a bus while at work at 1:30pm.  denies LOC.  pt has hematoma on L-occiptal area.  denies anticoags  denies blurry vision/n/v

## 2022-07-20 NOTE — ED ADULT TRIAGE NOTE - NS ED NURSE BANDS TYPE
REVIEW OF SYSTEMS:    CONSTITUTIONAL: No weakness, fevers or chills  EYES/ENT: No visual changes;  No vertigo or throat pain   NECK: No pain or stiffness  RESPIRATORY: No cough, wheezing, hemoptysis; No shortness of breath  CARDIOVASCULAR: No chest pain or palpitations  GASTROINTESTINAL: No abdominal or epigastric pain. No nausea, vomiting, or hematemesis; No diarrhea or constipation. No melena or hematochezia.  GENITOURINARY: No dysuria, frequency or hematuria  NEUROLOGICAL: + back pain. No weakness of numbness.   SKIN: No itching, rashes Name band;

## 2022-07-20 NOTE — ED PROVIDER NOTE - CONSTITUTIONAL, MLM
normal... Well appearing, awake, alert, oriented to person, place, time/situation and in no apparent distress. Head is tender to palpation but no echymosis or swelling at left parietal scallp

## 2022-07-20 NOTE — ED PROVIDER NOTE - CLINICAL SUMMARY MEDICAL DECISION MAKING FREE TEXT BOX
65 yo man, [facility] presents with left parietal ache, sharp, mild to moderate, constant after struck his head while he was under a bus and hit his head on the frame while he tried to stand up. Worried skull might be broken. No loc, no other symptoms, no nausea, vomiting, amnesia, numbness or weakness  Vitals unremarkable. Exam with left parietal scalp tenderness. Will give tylenol. r/o skull fracture, unlikely. r/o intracranial bleed. 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 Worried skull might be broken. No loc, no other symptoms, no nausea, vomiting, amnesia, numbness or weakness  Vitals unremarkable. Exam with left parietal scalp tenderness. Will give tylenol. r/o skull fracture, unlikely. r/o intracranial bleed. will obtain ct head noncontrast and reassess pain.    Pain improved with tylenol. No skull fracture or intracranial bleed on CT. plan for home with pcp followup

## 2022-07-21 VITALS
DIASTOLIC BLOOD PRESSURE: 85 MMHG | RESPIRATION RATE: 16 BRPM | OXYGEN SATURATION: 97 % | SYSTOLIC BLOOD PRESSURE: 139 MMHG | HEART RATE: 60 BPM | TEMPERATURE: 97 F

## 2022-07-21 PROCEDURE — 70450 CT HEAD/BRAIN W/O DYE: CPT | Mod: 26,MA

## 2022-07-21 RX ADMIN — Medication 650 MILLIGRAM(S): at 00:56

## 2022-07-21 NOTE — ED ADULT NURSE NOTE - CINV DISCH MEDS REVIEWED YN
Discharge instructions reviewed with patient and grandparents,understanding verbalized, Copy of discharge paperwork and prescriptions given to patient. Discharged from room per ambulatory with steady gait. Yes

## 2022-07-21 NOTE — ED ADULT NURSE REASSESSMENT NOTE - NS ED NURSE REASSESS COMMENT FT1
Pt sleeping comfortably in bed. No complaints at this time. Respirations equal and unlabored. No acute distress noted at this time.

## 2022-07-21 NOTE — ED ADULT NURSE REASSESSMENT NOTE - NS ED NURSE REASSESS COMMENT FT1
Pt AAOx4. Steady gait, ambulatory. No IV inserted during this ED visit.  D/C per MD orders. D/C instructions provided and verbalizes understanding of medication regimen and follow up care. Educational material provided. Respirations equal and unlabored, with no acute distress noted at this time. Pt denies any pain or complaints at this time.

## 2022-07-21 NOTE — ED ADULT NURSE NOTE - OBJECTIVE STATEMENT
Patient was received in ED c/o pain to right side of head after hitting his head at work / Patient denies any LOC .

## 2023-04-22 NOTE — ED ADULT TRIAGE NOTE - SOURCE OF INFORMATION
Past Medical History:   Diagnosis Date    ESRD on hemodialysis 2022    Gastritis 2022    EGD was 22    Gastroparesis 2022    has not had Emptying study    Heart failure with preserved ejection fraction 2022    EF 55% on 3/22    History of supraventricular tachycardia     Hyperkalemia 2022    Hypertensive emergency 2022    Sickle cell trait 2022    Type 2 diabetes mellitus        Past Surgical History:   Procedure Laterality Date     SECTION      x 3    COLONOSCOPY      COLONOSCOPY N/A 2022    Procedure: COLONOSCOPY;  Surgeon: Jagdeep eCdeno MD;  Location: [facility];  Service: Endoscopy;  Laterality: N/A;    ESOPHAGOGASTRODUODENOSCOPY N/A 10/18/2019    Procedure: ESOPHAGOGASTRODUODENOSCOPY (EGD);  Surgeon: Gianluca Mendez MD;  Location: [facility];  Service: Endoscopy;  Laterality: N/A;    ESOPHAGOGASTRODUODENOSCOPY N/A 2022    Procedure: EGD (ESOPHAGOGASTRODUODENOSCOPY);  Surgeon: Micky Paredes III, MD;  Location: [facility];  Service: Endoscopy;  Laterality: N/A;    ESOPHAGOGASTRODUODENOSCOPY N/A 2022    Procedure: EGD (ESOPHAGOGASTRODUODENOSCOPY);  Surgeon: Marcelo Zhong MD;  Location: [facility];  Service: Endoscopy;  Laterality: N/A;    LAPAROSCOPIC CHOLECYSTECTOMY N/A 2022    Procedure: CHOLECYSTECTOMY, LAPAROSCOPIC;  Surgeon: Grey Perez MD;  Location: [facility];  Service: General;  Laterality: N/A;    PLACEMENT OF DUAL-LUMEN VASCULAR CATHETER Left 2022    Procedure: INSERTION-CATHETER-JOSEPH;  Surgeon: Dionte Gan MD;  Location: [facility] OR;  Service: General;  Laterality: Left;    PLACEMENT OF DUAL-LUMEN VASCULAR CATHETER Right 2022    Procedure: INSERTION-CATHETER-Hemosplit;  Surgeon: Dionte Gan MD;  Location: [facility] OR;  Service: General;  Laterality: Right;       Review of patient's allergies indicates:   Allergen Reactions    Penicillins Hives       Current Facility-Administered Medications on File Prior  "to Encounter   Medication    [COMPLETED] haloperidol lactate injection 5 mg    [COMPLETED] insulin regular injection 10 Units 0.1 mL    [COMPLETED] prochlorperazine injection Soln 10 mg    [DISCONTINUED] cloNIDine 0.2 mg/24 hr td ptwk 1 patch     Current Outpatient Medications on File Prior to Encounter   Medication Sig    albuterol (PROVENTIL/VENTOLIN HFA) 90 mcg/actuation inhaler Inhale 2 puffs into the lungs every 6 (six) hours as needed for Wheezing. Rescue    amitriptyline (ELAVIL) 50 MG tablet Take 1 tablet (50 mg total) by mouth every evening.    amLODIPine (NORVASC) 5 MG tablet Take 1 tablet (5 mg total) by mouth once daily.    apixaban (ELIQUIS) 5 mg Tab Take 1 tablet (5 mg total) by mouth 2 (two) times daily.    carvediloL (COREG) 25 MG tablet Take 1 tablet twice a day by oral route for 30 days.    dicyclomine (BENTYL) 10 MG capsule Take 1 capsule (10 mg total) by mouth 4 (four) times daily as needed (abdominal pain).    FLUoxetine 20 MG capsule Take 1 capsule every day by oral route for 30 days.    gabapentin (NEURONTIN) 100 MG capsule Take 1 capsule by mouth 3 times daily    hydrALAZINE (APRESOLINE) 100 MG tablet Take 1 tablet twice a day by oral route for 30 days.    insulin aspart U-100 (NOVOLOG) 100 unit/mL (3 mL) InPn pen Inject 4 Units into the skin 3 (three) times daily with meals.    insulin detemir U-100, Levemir, 100 unit/mL (3 mL) SubQ InPn pen Inject 6 Units into the skin 2 (two) times daily.    isosorbide mononitrate (IMDUR) 60 MG 24 hr tablet Take 1 tablet every day by oral route for 30 days.    levETIRAcetam (KEPPRA) 500 MG Tab Take 1 tablet twice a day by oral route for 30 days.    ondansetron (ZOFRAN-ODT) 4 MG TbDL Take 1 tablet (4 mg total) by mouth every 6 (six) hours as needed.    pantoprazole (PROTONIX) 40 MG tablet Take 1 tablet every day by oral route for 30 days.    pen needle, diabetic 31 gauge x 3/16" Ndle Use as directed to inject insulin 5 times daily    sucralfate (CARAFATE) " 100 mg/mL suspension Take 10 mLs (1 g total) by mouth 4 (four) times daily before meals and nightly.    [DISCONTINUED] atenoloL (TENORMIN) 50 MG tablet Take 1 tablet (50 mg total) by mouth every other day.    [DISCONTINUED] omeprazole (PRILOSEC) 20 MG capsule Take 2 capsules (40 mg total) by mouth once daily. for 10 days     Family History       Problem Relation (Age of Onset)    Diabetes Mother, Father          Tobacco Use    Smoking status: Never    Smokeless tobacco: Never   Substance and Sexual Activity    Alcohol use: Not Currently    Drug use: No    Sexual activity: Not Currently     Partners: Male     Birth control/protection: I.U.D.     Review of Systems   Constitutional:  Negative for fever.   Cardiovascular:  Negative for leg swelling.   Gastrointestinal:  Positive for abdominal pain. Negative for constipation.   Genitourinary:         Does not produce urine   Psychiatric/Behavioral:  Positive for dysphoric mood.    Objective:     Vital Signs (Most Recent):  Temp: 97.6 °F (36.4 °C) (04/22/23 1446)  Pulse: 92 (04/22/23 1446)  Resp: 20 (04/22/23 1446)  BP: (!) 187/121 (04/22/23 1446)  SpO2: 96 % (04/22/23 1446)   Vital Signs (24h Range):  Temp:  [97.6 °F (36.4 °C)-98.2 °F (36.8 °C)] 97.6 °F (36.4 °C)  Pulse:  [92-96] 92  Resp:  [20-25] 20  SpO2:  [95 %-97 %] 96 %  BP: (169-187)/(102-125) 187/121     Weight: 54 kg (119 lb)  Body mass index is 21.77 kg/m².    Physical Exam  Vitals and nursing note reviewed.   Constitutional:       Comments: Appears older than stated age, at times sitting in bed calling out and rubbing abdomen, observed from afar at times lying in bed with eyes closed   HENT:      Head: Normocephalic and atraumatic.      Mouth/Throat:      Mouth: Mucous membranes are moist.   Cardiovascular:      Rate and Rhythm: Normal rate and regular rhythm.      Comments: Warm peripheries, no significant LE edema  Pulmonary:      Effort: Pulmonary effort is normal. No respiratory distress.      Breath  sounds: Normal breath sounds. No stridor. No wheezing or rhonchi.      Comments: On RA  Abdominal:      Comments: Abdomen non distended, soft, BS heard   Skin:     Comments: R sided chest wall dialysis catheter with dressing overlying    Neurological:      Mental Status: She is alert and oriented to person, place, and time.      Comments: No aphasia, moving all four extremities spontaneously           Significant Labs: Bilirubin:   Recent Labs   Lab 04/13/23  0716 04/14/23  0632 04/17/23  0958 04/21/23  0455 04/22/23  0829   BILITOT 1.1* 0.9 1.0 1.4* 2.0*     BMP:   Recent Labs   Lab 04/22/23  0829   *   *   K 5.4*   CL 89*   CO2 26   BUN 51*   CREATININE 5.7*   CALCIUM 9.0   MG 2.1     CBC:   Recent Labs   Lab 04/21/23  0455 04/22/23  0829   WBC 5.30 3.73*   HGB 8.6* 7.5*   HCT 24.9* 22.2*   * 75*     CMP:   Recent Labs   Lab 04/21/23  0455 04/22/23  0829    130*   K 5.3* 5.4*   CL 98 89*   CO2 22* 26   * 718*   BUN 30* 51*   CREATININE 3.9* 5.7*   CALCIUM 10.2 9.0   PROT 9.0* 7.5   ALBUMIN 3.9 3.4*   BILITOT 1.4* 2.0*   ALKPHOS 464* 403*   AST 71* 53*   ALT 65* 63*   ANIONGAP 20* 15     Lactic Acid: No results for input(s): LACTATE in the last 48 hours.  Magnesium:   Recent Labs   Lab 04/22/23  0829   MG 2.1     POCT Glucose:   Recent Labs   Lab 04/22/23  0828 04/22/23  1020   POCTGLUCOSE >500* 383*     Troponin: No results for input(s): TROPONINI, TROPONINIHS in the last 48 hours.  TSH:   Recent Labs   Lab 02/08/23  0505   TSH 1.141     Urine Culture: No results for input(s): LABURIN in the last 48 hours.  Urine Studies: No results for input(s): COLORU, APPEARANCEUA, PHUR, SPECGRAV, PROTEINUA, GLUCUA, KETONESU, BILIRUBINUA, OCCULTUA, NITRITE, UROBILINOGEN, LEUKOCYTESUR, RBCUA, WBCUA, BACTERIA, SQUAMEPITHEL, HYALINECASTS in the last 48 hours.    Invalid input(s): HAY    Significant Imaging: I have reviewed all pertinent imaging results/findings within the past 24  hours.    X-Ray Abdomen AP 1 View (KUB)    Result Date: 4/22/2023  EXAMINATION: XR ABDOMEN AP 1 VIEW CLINICAL HISTORY: Generalized abdominal pain TECHNIQUE: AP View(s) of the abdomen was performed. COMPARISON: Abdomen x-ray of March 13, 2023 FINDINGS: The bowel gas pattern is within normal limits.  Surgical clips in the right upper quadrant are consistent with prior cholecystectomy.  No free air is seen.  No masses or calcifications are identified.     Prior cholecystectomy.  Otherwise negative abdomen x-ray. Electronically signed by: Gianluca Arriaga MD Date:    04/22/2023 Time:    09:51    X-Ray Chest AP Portable    Result Date: 4/22/2023  EXAMINATION: XR CHEST AP PORTABLE CLINICAL HISTORY: Shortness of breath TECHNIQUE: Single frontal view of the chest was performed. COMPARISON: Chest x-ray of April 17, 2022 FINDINGS: A double lumen central line enters on the right and ends at the level of the right atrium.  There is cardiomegaly in a biventricular pattern.  Intrapulmonary mass or infiltrate is not seen.  There is no pneumothorax or pleural effusion.     Moderate cardiomegaly.  Double-lumen central line ends at the level of the right atrium. Electronically signed by: Gianluca Arriaga MD Date:    04/22/2023 Time:    09:52    CT Abdomen Pelvis  Without Contrast    Result Date: 4/21/2023  EXAMINATION: CT ABDOMEN PELVIS WITHOUT CONTRAST CLINICAL HISTORY: Abdominal pain, acute, nonlocalized; TECHNIQUE: Low dose axial images, sagittal and coronal reformations were obtained from the lung bases to the pubic symphysis.  Oral contrast was not administered. COMPARISON: 04/10/2023 FINDINGS: There is patchy airspace disease and some interlobular septal thickening in the lung bases heart is enlarged and there is also pericardial fluid.  The tip a venous access catheter is present in the right atrium.  Cholecystectomy. Liver is 20 cm in length and spleen is 14 cm in length.  Remaining solid abdominal organs are grossly  Patient unremarkable on this noncontrast exam. There is no enteric contrast which limits bowel assessment.  Stomach is mildly distended.  Moderate degree of stool throughout the colon. Extensive atherosclerosis.  Unremarkable noncontrast uterus.  There is mild diffuse urinary bladder wall thickening. No acute osseous findings.     1. Urinary bladder wall thickening is nonspecific.  Cystitis and outlet obstruction are 2 considerations. 2. Colonic constipation is possible. 3. Hepatosplenomegaly. 4. Cardiomegaly with pericardial effusion. 5. Bibasilar airspace disease could reflect edema or multifocal pneumonia. Electronically signed by: Kaushik Gutierrez Date:    04/21/2023 Time:    08:47      Echocardiogram  Summary    The left ventricle is normal in size with moderate concentric hypertrophy and normal systolic function.  The estimated ejection fraction is 55%.  Grade III left ventricular diastolic dysfunction.  Normal right ventricular size with normal right ventricular systolic function.  Moderate left atrial enlargement.  Moderate to severe tricuspid regurgitation.  Mild to moderate pulmonic regurgitation.  Mild mitral regurgitation.  Normal central venous pressure (3 mmHg).  The estimated PA systolic pressure is 56 mmHg.  There is pulmonary hypertension.  Moderate to large circumferential pericardial effusion. No evidence of tamponade.

## 2023-12-22 ENCOUNTER — EMERGENCY (EMERGENCY)
Facility: HOSPITAL | Age: 68
LOS: 0 days | Discharge: HOME | End: 2023-12-23
Attending: STUDENT IN AN ORGANIZED HEALTH CARE EDUCATION/TRAINING PROGRAM
Payer: COMMERCIAL

## 2023-12-22 VITALS
RESPIRATION RATE: 16 BRPM | SYSTOLIC BLOOD PRESSURE: 129 MMHG | OXYGEN SATURATION: 98 % | TEMPERATURE: 98 F | HEART RATE: 59 BPM | DIASTOLIC BLOOD PRESSURE: 74 MMHG

## 2023-12-22 VITALS
RESPIRATION RATE: 19 BRPM | SYSTOLIC BLOOD PRESSURE: 126 MMHG | TEMPERATURE: 98 F | HEIGHT: 66 IN | DIASTOLIC BLOOD PRESSURE: 81 MMHG | HEART RATE: 83 BPM | OXYGEN SATURATION: 100 % | WEIGHT: 179.9 LBS

## 2023-12-22 DIAGNOSIS — B97.89 OTHER VIRAL AGENTS AS THE CAUSE OF DISEASES CLASSIFIED ELSEWHERE: ICD-10-CM

## 2023-12-22 DIAGNOSIS — R51.9 HEADACHE, UNSPECIFIED: ICD-10-CM

## 2023-12-22 DIAGNOSIS — E11.9 TYPE 2 DIABETES MELLITUS WITHOUT COMPLICATIONS: ICD-10-CM

## 2023-12-22 DIAGNOSIS — I10 ESSENTIAL (PRIMARY) HYPERTENSION: ICD-10-CM

## 2023-12-22 DIAGNOSIS — R05.8 OTHER SPECIFIED COUGH: ICD-10-CM

## 2023-12-22 DIAGNOSIS — J06.9 ACUTE UPPER RESPIRATORY INFECTION, UNSPECIFIED: ICD-10-CM

## 2023-12-22 PROCEDURE — 99284 EMERGENCY DEPT VISIT MOD MDM: CPT

## 2023-12-22 PROCEDURE — 71046 X-RAY EXAM CHEST 2 VIEWS: CPT | Mod: 26

## 2023-12-22 RX ORDER — ACETAMINOPHEN 500 MG
1 TABLET ORAL
Qty: 21 | Refills: 0
Start: 2023-12-22 | End: 2023-12-28

## 2023-12-22 RX ORDER — METHOCARBAMOL 500 MG/1
750 TABLET, FILM COATED ORAL ONCE
Refills: 0 | Status: COMPLETED | OUTPATIENT
Start: 2023-12-22 | End: 2023-12-22

## 2023-12-22 RX ORDER — ACETAMINOPHEN 500 MG
650 TABLET ORAL ONCE
Refills: 0 | Status: COMPLETED | OUTPATIENT
Start: 2023-12-22 | End: 2023-12-22

## 2023-12-22 RX ORDER — METHOCARBAMOL 500 MG/1
1 TABLET, FILM COATED ORAL
Qty: 21 | Refills: 0
Start: 2023-12-22 | End: 2023-12-28

## 2023-12-22 RX ADMIN — METHOCARBAMOL 750 MILLIGRAM(S): 500 TABLET, FILM COATED ORAL at 22:12

## 2023-12-22 RX ADMIN — Medication 650 MILLIGRAM(S): at 22:12

## 2023-12-22 NOTE — ED PROVIDER NOTE - CLINICAL SUMMARY MEDICAL DECISION MAKING FREE TEXT BOX
67 y/o M hx of HTN, DM presents w/ generalized body aches for the past 3 weeks. endorsing testing positive for flu earlier this week at urgent care. endorsing dry cough. endorsing mild generalized headache. denies chest pain/sob.   well appearing, stable vitals, not in any acute distress. lungs ctab.   cxr, muscle relaxants for muscle cramps, pain control. patient has had symptoms for 3 weeks, symptomatic control, outside window for tamiflu. 69 y/o M hx of HTN, DM presents w/ generalized body aches for the past 3 weeks. endorsing testing positive for flu earlier this week at urgent care. endorsing dry cough. endorsing mild generalized headache. denies chest pain/sob.   well appearing, stable vitals, not in any acute distress. lungs ctab.   cxr, muscle relaxants for muscle cramps, pain control. patient has had symptoms for 3 weeks, symptomatic control, outside window for tamiflu.

## 2023-12-22 NOTE — ED PROVIDER NOTE - PHYSICAL EXAMINATION
General: Well appearing male in no acute distress  HEENT: Normocephalic, atraumatic. Moist mucous membranes. Oropharynx clear. No lymphadenopathy.  Eyes: No scleral icterus. EOMI. ANIA.  Neck:. Soft and supple. Full ROM without pain. No midline tenderness  Cardiac: Regular rate and regular rhythm. No murmurs, rubs, gallops. Peripheral pulses 2+ and symmetric. No LE edema.  Resp: Lungs CTAB. Speaking in full sentences. No wheezes, rales or rhonchi.  Abd: Soft, non-tender, non-distended. No guarding or rebound. No scars, masses, or lesions.  Back: Spine midline and non-tender. No CVA tenderness.    Skin: No rashes, abrasions, or lacerations.  Neuro: AO x 3. Moves all extremities symmetrically. Motor strength and sensation grossly intact.

## 2023-12-22 NOTE — ED ADULT NURSE NOTE - NSFALLUNIVINTERV_ED_ALL_ED
Bed/Stretcher in lowest position, wheels locked, appropriate side rails in place/Call bell, personal items and telephone in reach/Instruct patient to call for assistance before getting out of bed/chair/stretcher/Non-slip footwear applied when patient is off stretcher/[place] to call system/Physically safe environment - no spills, clutter or unnecessary equipment/Purposeful proactive rounding/Room/bathroom lighting operational, light cord in reach Bed/Stretcher in lowest position, wheels locked, appropriate side rails in place/Call bell, personal items and telephone in reach/Instruct patient to call for assistance before getting out of bed/chair/stretcher/Non-slip footwear applied when patient is off stretcher/[place] to call system/Physically safe environment - no spills, clutter or unnecessary equipment/Purposeful proactive rounding/Room/bathroom lighting operational, light cord in reach

## 2023-12-22 NOTE — ED PROVIDER NOTE - PATIENT PORTAL LINK FT
You can access the FollowMyHealth Patient Portal offered by [facility] by registering at the following website: [email or web address]. By joining [facility]’s FollowMyHealth portal, you will also be able to view your health information using other applications (apps) compatible with our system. You can access the FollowMyHealth Patient Portal offered by [facility] by registering at the following website: [email or web address]. By joining [facility]’s FollowMyHealth portal, you will also be able to view your health information using other applications (apps) compatible with our system.

## 2023-12-22 NOTE — ED PROVIDER NOTE - NS ED ROS FT
General: Denies fever, chills  HEENT: Denies sensory changes, sore throat  Neck: Denies neck pain, neck stiffness  Resp: + coughing, Denies SOB  Cardiovascular: Denies CP, palpitations, LE edema  GI: Denies nausea, vomiting, abdominal pain, diarrhea, constipation, blood in stool  : Denies dysuria, hematuria, frequency, incontinence  MSK: Denies back pain  Neuro: Denies HA, dizziness, numbness, weakness  Skin: Denies rashes.

## 2023-12-22 NOTE — ED ADULT NURSE NOTE - OBJECTIVE STATEMENT
Pt presents a&ox4 c/o "I've been having some body aches and chills since I found out I had the flu a few days ago. It just hasn't really gotten much better." He adds that he has a nonproductive cough. He adds that he feels the aches a lot in his legs and states "it feels like my muscles."  Pt is able to speak in complete sentences. Ambulatory with steady gait. Respirations are even and unlabored on room air. Skin is warm and dry.

## 2023-12-22 NOTE — ED PROVIDER NOTE - NSFOLLOWUPINSTRUCTIONS_ED_ALL_ED_FT
Viral Respiratory Infection    A viral respiratory infection is an illness that affects parts of the body used for breathing, like the lungs, nose, and throat. It is caused by a germ called a virus. Symptoms can include runny nose, coughing, sneezing, fatigue, body aches, sore throat, fever, or headache. Over the counter medicine can be used to manage the symptoms but the infection typically goes away on its own in 5 to 10 days.     SEEK IMMEDIATE MEDICAL CARE IF YOU HAVE ANY OF THE FOLLOWING SYMPTOMS: shortness of breath, chest pain, fever over 10 days, or lightheadedness/dizziness. can take robaxin every 8 hours for muscle spasm as needed, do not take prior to driving/ operating machinery.  can take acetaminophen every 8 hours for pain as needed.     Viral Respiratory Infection    A viral respiratory infection is an illness that affects parts of the body used for breathing, like the lungs, nose, and throat. It is caused by a germ called a virus. Symptoms can include runny nose, coughing, sneezing, fatigue, body aches, sore throat, fever, or headache. Over the counter medicine can be used to manage the symptoms but the infection typically goes away on its own in 5 to 10 days.     SEEK IMMEDIATE MEDICAL CARE IF YOU HAVE ANY OF THE FOLLOWING SYMPTOMS: shortness of breath, chest pain, fever over 10 days, or lightheadedness/dizziness.

## 2023-12-22 NOTE — ED PROVIDER NOTE - OBJECTIVE STATEMENT
67 y/o M hx of HTN, DM presents w/ generalized body aches for the past 3 weeks. endorsing testing positive for flu earlier this week at urgent care. endorsing dry cough. endorsing mild generalized headache. denies chest pain/sob. 69 y/o M hx of HTN, DM presents w/ generalized body aches for the past 3 weeks. endorsing testing positive for flu earlier this week at urgent care. endorsing dry cough. endorsing mild generalized headache. denies chest pain/sob.

## 2024-01-16 ENCOUNTER — NON-APPOINTMENT (OUTPATIENT)
Age: 69
End: 2024-01-16

## 2024-01-16 ENCOUNTER — APPOINTMENT (OUTPATIENT)
Dept: OPHTHALMOLOGY | Facility: CLINIC | Age: 69
End: 2024-01-16
Payer: COMMERCIAL

## 2024-01-16 PROCEDURE — 92014 COMPRE OPH EXAM EST PT 1/>: CPT

## 2024-01-16 PROCEDURE — 92083 EXTENDED VISUAL FIELD XM: CPT

## 2024-01-16 PROCEDURE — 92133 CPTRZD OPH DX IMG PST SGM ON: CPT

## 2024-03-10 NOTE — DISCHARGE NOTE ADULT - CONDITIONS AT DISCHARGE
Pt A&Ox4, VSS. Tele and IV removed - pt provided with discharge instructions and medication review. Pt verbalized understanding.
normal for race

## 2024-04-06 ENCOUNTER — INPATIENT (INPATIENT)
Facility: HOSPITAL | Age: 69
LOS: 2 days | Discharge: HOME | End: 2024-04-09
Attending: INTERNAL MEDICINE | Admitting: INTERNAL MEDICINE
Payer: COMMERCIAL

## 2024-04-06 VITALS
OXYGEN SATURATION: 100 % | RESPIRATION RATE: 16 BRPM | DIASTOLIC BLOOD PRESSURE: 75 MMHG | SYSTOLIC BLOOD PRESSURE: 130 MMHG | HEART RATE: 75 BPM | TEMPERATURE: 98 F

## 2024-04-06 LAB
ADD ON TEST-SPECIMEN IN LAB: SIGNIFICANT CHANGE UP
ALBUMIN SERPL ELPH-MCNC: 4.1 G/DL — SIGNIFICANT CHANGE UP (ref 3.3–5)
ALP SERPL-CCNC: 63 U/L — SIGNIFICANT CHANGE UP (ref 40–120)
ALT FLD-CCNC: 22 U/L — SIGNIFICANT CHANGE UP (ref 4–41)
ANION GAP SERPL CALC-SCNC: 12 MMOL/L — SIGNIFICANT CHANGE UP (ref 7–14)
AST SERPL-CCNC: 25 U/L — SIGNIFICANT CHANGE UP (ref 4–40)
BASOPHILS # BLD AUTO: 0.02 K/UL — SIGNIFICANT CHANGE UP (ref 0–0.2)
BASOPHILS NFR BLD AUTO: 0.3 % — SIGNIFICANT CHANGE UP (ref 0–2)
BILIRUB SERPL-MCNC: 0.2 MG/DL — SIGNIFICANT CHANGE UP (ref 0.2–1.2)
BUN SERPL-MCNC: 18 MG/DL — SIGNIFICANT CHANGE UP (ref 7–23)
CALCIUM SERPL-MCNC: 9.2 MG/DL — SIGNIFICANT CHANGE UP (ref 8.4–10.5)
CHLORIDE SERPL-SCNC: 103 MMOL/L — SIGNIFICANT CHANGE UP (ref 98–107)
CO2 SERPL-SCNC: 21 MMOL/L — LOW (ref 22–31)
CREAT SERPL-MCNC: 1.14 MG/DL — SIGNIFICANT CHANGE UP (ref 0.5–1.3)
EGFR: 70 ML/MIN/1.73M2 — SIGNIFICANT CHANGE UP
EOSINOPHIL # BLD AUTO: 0.12 K/UL — SIGNIFICANT CHANGE UP (ref 0–0.5)
EOSINOPHIL NFR BLD AUTO: 1.8 % — SIGNIFICANT CHANGE UP (ref 0–6)
GLUCOSE SERPL-MCNC: 136 MG/DL — HIGH (ref 70–99)
HCT VFR BLD CALC: 40.1 % — SIGNIFICANT CHANGE UP (ref 39–50)
HGB BLD-MCNC: 13.3 G/DL — SIGNIFICANT CHANGE UP (ref 13–17)
IANC: 3.99 K/UL — SIGNIFICANT CHANGE UP (ref 1.8–7.4)
IMM GRANULOCYTES NFR BLD AUTO: 0.2 % — SIGNIFICANT CHANGE UP (ref 0–0.9)
LIDOCAIN IGE QN: 52 U/L — SIGNIFICANT CHANGE UP (ref 7–60)
LYMPHOCYTES # BLD AUTO: 2 K/UL — SIGNIFICANT CHANGE UP (ref 1–3.3)
LYMPHOCYTES # BLD AUTO: 30.1 % — SIGNIFICANT CHANGE UP (ref 13–44)
MAGNESIUM SERPL-MCNC: 2.2 MG/DL — SIGNIFICANT CHANGE UP (ref 1.6–2.6)
MCHC RBC-ENTMCNC: 26.5 PG — LOW (ref 27–34)
MCHC RBC-ENTMCNC: 33.2 GM/DL — SIGNIFICANT CHANGE UP (ref 32–36)
MCV RBC AUTO: 80 FL — SIGNIFICANT CHANGE UP (ref 80–100)
MONOCYTES # BLD AUTO: 0.5 K/UL — SIGNIFICANT CHANGE UP (ref 0–0.9)
MONOCYTES NFR BLD AUTO: 7.5 % — SIGNIFICANT CHANGE UP (ref 2–14)
NEUTROPHILS # BLD AUTO: 3.99 K/UL — SIGNIFICANT CHANGE UP (ref 1.8–7.4)
NEUTROPHILS NFR BLD AUTO: 60.1 % — SIGNIFICANT CHANGE UP (ref 43–77)
NRBC # BLD: 0 /100 WBCS — SIGNIFICANT CHANGE UP (ref 0–0)
NRBC # FLD: 0 K/UL — SIGNIFICANT CHANGE UP (ref 0–0)
PHOSPHATE SERPL-MCNC: 3.9 MG/DL — SIGNIFICANT CHANGE UP (ref 2.5–4.5)
PLATELET # BLD AUTO: 204 K/UL — SIGNIFICANT CHANGE UP (ref 150–400)
POTASSIUM SERPL-MCNC: 4.5 MMOL/L — SIGNIFICANT CHANGE UP (ref 3.5–5.3)
POTASSIUM SERPL-SCNC: 4.5 MMOL/L — SIGNIFICANT CHANGE UP (ref 3.5–5.3)
PROT SERPL-MCNC: 6.8 G/DL — SIGNIFICANT CHANGE UP (ref 6–8.3)
RBC # BLD: 5.01 M/UL — SIGNIFICANT CHANGE UP (ref 4.2–5.8)
RBC # FLD: 13.3 % — SIGNIFICANT CHANGE UP (ref 10.3–14.5)
SODIUM SERPL-SCNC: 136 MMOL/L — SIGNIFICANT CHANGE UP (ref 135–145)
TROPONIN T, HIGH SENSITIVITY RESULT: 10 NG/L — SIGNIFICANT CHANGE UP
TROPONIN T, HIGH SENSITIVITY RESULT: 13 NG/L — SIGNIFICANT CHANGE UP
TSH SERPL-MCNC: 1.38 UIU/ML — SIGNIFICANT CHANGE UP (ref 0.27–4.2)
WBC # BLD: 6.64 K/UL — SIGNIFICANT CHANGE UP (ref 3.8–10.5)
WBC # FLD AUTO: 6.64 K/UL — SIGNIFICANT CHANGE UP (ref 3.8–10.5)

## 2024-04-06 PROCEDURE — 99223 1ST HOSP IP/OBS HIGH 75: CPT

## 2024-04-06 PROCEDURE — 71046 X-RAY EXAM CHEST 2 VIEWS: CPT | Mod: 26

## 2024-04-06 RX ORDER — ASPIRIN/CALCIUM CARB/MAGNESIUM 324 MG
162 TABLET ORAL ONCE
Refills: 0 | Status: COMPLETED | OUTPATIENT
Start: 2024-04-06 | End: 2024-04-06

## 2024-04-06 RX ADMIN — Medication 162 MILLIGRAM(S): at 21:09

## 2024-04-06 NOTE — ED ADULT NURSE NOTE - CAS EDP DISCH DISPOSITION ADMI
Detail Level: Simple Instructions: This plan will send the code FBSD to the PM system.  DO NOT or CHANGE the price. Price (Do Not Change): 0.00 721C/Telemetry

## 2024-04-06 NOTE — ED CDU PROVIDER INITIAL DAY NOTE - CLINICAL SUMMARY MEDICAL DECISION MAKING FREE TEXT BOX
68-year-old male past medical history of hypertension, diabetes presents for chest discomfort for 1 week.  Vital signs stable.  EKG sinus rhythm with nonspecific intraventricular conduction delay, nonischemic.  Low concern for acute coronary syndrome as chest pain non-exertional, non-radiating, and there is no nausea or diaphoresis.  Low concern for aortic dissection as chest pain non-acute onset, not radiating to back, not described as "tearing", no pulse or neuro deficit on exam.  Low concern for pulmonary embolism.  Plan for labs, cxr.  Disposition pending. 67 y/o male with a hx of DM, HTN presented to the ER c/o 1 week of chest pain.  Pt placed in CDU for cardiac monitoring, stress test, Tele doc.

## 2024-04-06 NOTE — ED ADULT TRIAGE NOTE - CHIEF COMPLAINT QUOTE
Pt c/o intermittent, non-radiating, midsternal chest discomfort x 1week. Denies recent injuries, SOB, fever, chills, n/v. pmhx: DM2

## 2024-04-06 NOTE — ED CDU PROVIDER INITIAL DAY NOTE - ATTENDING APP SHARED VISIT CONTRIBUTION OF CARE
I have evaluated the patient and agree with the documentation and assessment as made by the PA. We have discussed plan of care and work up for the patient.      GEN:  Non-toxic appearing, non-distressed, speaking full sentences, non-diaphoretic, AAOx3  	HEENT:  NCAT, neck supple, EOMI, PERRLA, sclera anicteric, no conjunctival pallor or injection, no stridor, normal voice, no tonsillar exudate, uvula midline  	CV:  regular rhythm and rate, s1/s2 audible, no murmurs, rubs or gallops, peripheral pulses 2+ and symmetric  	PULM:  non-labored respirations, lungs clear to auscultation bilaterally, no wheezes, crackles or rales  	ABD:  non distended, non-tender, no rebound, no guarding, negative Crowell's sign, bowel sounds normal, no cvat  	MSK:  no gross deformity, non-tender extremities and joints, range of motion grossly normal appearing, no extremity edema, extremities warm and well perfused   	NEURO:  AAOx3, CN II-XII intact, motor 5/5 in upper and lower extremities bilaterally, sensation grossly intact in extremities and trunk, finger to nose testing wnl, no nystagmus, negative Romberg, no pronator drift, no gait deficit  SKIN:  warm, dry, no rash or vesicles

## 2024-04-06 NOTE — ED ADULT NURSE REASSESSMENT NOTE - NS ED NURSE REASSESS COMMENT FT1
report received from AM RN. pt remains at baseline mental status A&OX4, RR even unlabored completing full clear sentences. pt resting in stretcher comfortably at this time, no new complaints offered. NAD noted. rpt troponin drawn and sent as ordered. VS per flowsheet. stretcher lowest position siderails up safety measures in place.
Pt a&ox4, denying chest pain, sob, n+v, headache, dizziness. Breathing even, unlabored. Pt medicated as per MAR. Pending CDU.

## 2024-04-06 NOTE — ED PROVIDER NOTE - CLINICAL SUMMARY MEDICAL DECISION MAKING FREE TEXT BOX
68-year-old male past medical history of hypertension, diabetes presents for chest discomfort for 1 week.  Vital signs stable.  EKG sinus rhythm with nonspecific intraventricular conduction delay, nonischemic.  Low concern for acute coronary syndrome as chest pain non-exertional, non-radiating, and there is no nausea or diaphoresis.  Low concern for aortic dissection as chest pain non-acute onset, not radiating to back, not described as "tearing", no pulse or neuro deficit on exam.  Low concern for pulmonary embolism.  Plan for labs, cxr.  Disposition pending.
31-May-2022 18:37:46

## 2024-04-06 NOTE — ED ADULT NURSE NOTE - OBJECTIVE STATEMENT
A&Ox4. ambulatory. c/o intermittent non radiating left side chest pain for one week. PT states he is a DM2 and takes metformin. pt denies SOB, dizziness, weakness, urinary symptoms, HA, n/v/d, fevers, chills, pain. respirations are even and un labored. 20g placed to LAC. skin intact. EKG obtained. safety precautions maintained.

## 2024-04-06 NOTE — ED PROVIDER NOTE - PHYSICAL EXAMINATION
GEN:  Non-toxic appearing, non-distressed, speaking full sentences, non-diaphoretic, AAOx3  HEENT:  NCAT, neck supple, EOMI, PERRLA, sclera anicteric, no conjunctival pallor or injection, no stridor, normal voice, no tonsillar exudate, uvula midline  CV:  regular rhythm and rate, s1/s2 audible, no murmurs, rubs or gallops, peripheral pulses 2+ and symmetric  PULM:  non-labored respirations, lungs clear to auscultation bilaterally, no wheezes, crackles or rales  ABD:  non distended, non-tender, no rebound, no guarding, negative Crowell's sign, bowel sounds normal, no cvat  MSK:  no gross deformity, non-tender extremities and joints, range of motion grossly normal appearing, no extremity edema, extremities warm and well perfused   NEURO:  AAOx3, CN II-XII intact, motor 5/5 in upper and lower extremities bilaterally, sensation grossly intact in extremities and trunk, finger to nose testing wnl, no nystagmus, negative Romberg, no pronator drift, no gait deficit  SKIN:  warm, dry, no rash or vesicles

## 2024-04-06 NOTE — ED PROVIDER NOTE - OBJECTIVE STATEMENT
68-year-old male past medical history of hypertension, diabetes presents for chest discomfort for 1 week.  Patient states chest pain is substernal, sharp and burning, nonradiating, nonexertional, nonpleuritic.  No aggravating or alleviating factors.  Denies recent travel or sick contacts.  Non-smoker, denies alcohol or illicit drug use.  No history of DVT or PE.  Reports negative stress test many years ago.  Denies shortness of breath, palpitations, leg swelling, hemoptysis, cough, fever, chills, nausea, vomiting, sweating.

## 2024-04-06 NOTE — ED CDU PROVIDER INITIAL DAY NOTE - DETAILS
69 y/o male with a hx of DM, HTN presented to the ER c/o 1 week of chest pain.  Pt placed in CDU for cardiac monitoring, stress test, Tele doc.

## 2024-04-07 ENCOUNTER — RESULT REVIEW (OUTPATIENT)
Age: 69
End: 2024-04-07

## 2024-04-07 DIAGNOSIS — R07.9 CHEST PAIN, UNSPECIFIED: ICD-10-CM

## 2024-04-07 DIAGNOSIS — I25.10 ATHEROSCLEROTIC HEART DISEASE OF NATIVE CORONARY ARTERY WITHOUT ANGINA PECTORIS: ICD-10-CM

## 2024-04-07 DIAGNOSIS — E11.9 TYPE 2 DIABETES MELLITUS WITHOUT COMPLICATIONS: ICD-10-CM

## 2024-04-07 DIAGNOSIS — I10 ESSENTIAL (PRIMARY) HYPERTENSION: ICD-10-CM

## 2024-04-07 LAB
A1C WITH ESTIMATED AVERAGE GLUCOSE RESULT: 8.4 % — HIGH (ref 4–5.6)
ANION GAP SERPL CALC-SCNC: 11 MMOL/L — SIGNIFICANT CHANGE UP (ref 7–14)
BUN SERPL-MCNC: 16 MG/DL — SIGNIFICANT CHANGE UP (ref 7–23)
CALCIUM SERPL-MCNC: 9.1 MG/DL — SIGNIFICANT CHANGE UP (ref 8.4–10.5)
CHLORIDE SERPL-SCNC: 102 MMOL/L — SIGNIFICANT CHANGE UP (ref 98–107)
CHOLEST SERPL-MCNC: 153 MG/DL — SIGNIFICANT CHANGE UP
CO2 SERPL-SCNC: 26 MMOL/L — SIGNIFICANT CHANGE UP (ref 22–31)
CREAT SERPL-MCNC: 1.22 MG/DL — SIGNIFICANT CHANGE UP (ref 0.5–1.3)
EGFR: 64 ML/MIN/1.73M2 — SIGNIFICANT CHANGE UP
ESTIMATED AVERAGE GLUCOSE: 194 — SIGNIFICANT CHANGE UP
GLUCOSE BLDC GLUCOMTR-MCNC: 107 MG/DL — HIGH (ref 70–99)
GLUCOSE BLDC GLUCOMTR-MCNC: 148 MG/DL — HIGH (ref 70–99)
GLUCOSE BLDC GLUCOMTR-MCNC: 159 MG/DL — HIGH (ref 70–99)
GLUCOSE BLDC GLUCOMTR-MCNC: 203 MG/DL — HIGH (ref 70–99)
GLUCOSE SERPL-MCNC: 204 MG/DL — HIGH (ref 70–99)
HCT VFR BLD CALC: 41.9 % — SIGNIFICANT CHANGE UP (ref 39–50)
HDLC SERPL-MCNC: 43 MG/DL — SIGNIFICANT CHANGE UP
HGB BLD-MCNC: 13.9 G/DL — SIGNIFICANT CHANGE UP (ref 13–17)
LIPID PNL WITH DIRECT LDL SERPL: 68 MG/DL — SIGNIFICANT CHANGE UP
MCHC RBC-ENTMCNC: 26.8 PG — LOW (ref 27–34)
MCHC RBC-ENTMCNC: 33.2 GM/DL — SIGNIFICANT CHANGE UP (ref 32–36)
MCV RBC AUTO: 80.7 FL — SIGNIFICANT CHANGE UP (ref 80–100)
NON HDL CHOLESTEROL: 110 MG/DL — SIGNIFICANT CHANGE UP
NRBC # BLD: 0 /100 WBCS — SIGNIFICANT CHANGE UP (ref 0–0)
NRBC # FLD: 0 K/UL — SIGNIFICANT CHANGE UP (ref 0–0)
PLATELET # BLD AUTO: 201 K/UL — SIGNIFICANT CHANGE UP (ref 150–400)
POTASSIUM SERPL-MCNC: 4.5 MMOL/L — SIGNIFICANT CHANGE UP (ref 3.5–5.3)
POTASSIUM SERPL-SCNC: 4.5 MMOL/L — SIGNIFICANT CHANGE UP (ref 3.5–5.3)
RBC # BLD: 5.19 M/UL — SIGNIFICANT CHANGE UP (ref 4.2–5.8)
RBC # FLD: 13.3 % — SIGNIFICANT CHANGE UP (ref 10.3–14.5)
SODIUM SERPL-SCNC: 139 MMOL/L — SIGNIFICANT CHANGE UP (ref 135–145)
TRIGL SERPL-MCNC: 208 MG/DL — HIGH
WBC # BLD: 6.05 K/UL — SIGNIFICANT CHANGE UP (ref 3.8–10.5)
WBC # FLD AUTO: 6.05 K/UL — SIGNIFICANT CHANGE UP (ref 3.8–10.5)

## 2024-04-07 PROCEDURE — 99222 1ST HOSP IP/OBS MODERATE 55: CPT

## 2024-04-07 PROCEDURE — 92928 PRQ TCAT PLMT NTRAC ST 1 LES: CPT | Mod: RC

## 2024-04-07 PROCEDURE — 99233 SBSQ HOSP IP/OBS HIGH 50: CPT

## 2024-04-07 PROCEDURE — 93458 L HRT ARTERY/VENTRICLE ANGIO: CPT | Mod: 26,59

## 2024-04-07 PROCEDURE — 99284 EMERGENCY DEPT VISIT MOD MDM: CPT

## 2024-04-07 RX ORDER — LISINOPRIL 2.5 MG/1
1 TABLET ORAL
Qty: 0 | Refills: 0 | DISCHARGE

## 2024-04-07 RX ORDER — INSULIN LISPRO 100/ML
VIAL (ML) SUBCUTANEOUS AT BEDTIME
Refills: 0 | Status: DISCONTINUED | OUTPATIENT
Start: 2024-04-07 | End: 2024-04-09

## 2024-04-07 RX ORDER — SODIUM CHLORIDE 9 MG/ML
1000 INJECTION, SOLUTION INTRAVENOUS
Refills: 0 | Status: DISCONTINUED | OUTPATIENT
Start: 2024-04-07 | End: 2024-04-09

## 2024-04-07 RX ORDER — GLUCAGON INJECTION, SOLUTION 0.5 MG/.1ML
1 INJECTION, SOLUTION SUBCUTANEOUS ONCE
Refills: 0 | Status: DISCONTINUED | OUTPATIENT
Start: 2024-04-07 | End: 2024-04-09

## 2024-04-07 RX ORDER — ASPIRIN/CALCIUM CARB/MAGNESIUM 324 MG
81 TABLET ORAL DAILY
Refills: 0 | Status: DISCONTINUED | OUTPATIENT
Start: 2024-04-07 | End: 2024-04-09

## 2024-04-07 RX ORDER — ATORVASTATIN CALCIUM 80 MG/1
80 TABLET, FILM COATED ORAL AT BEDTIME
Refills: 0 | Status: DISCONTINUED | OUTPATIENT
Start: 2024-04-07 | End: 2024-04-09

## 2024-04-07 RX ORDER — LANOLIN ALCOHOL/MO/W.PET/CERES
3 CREAM (GRAM) TOPICAL AT BEDTIME
Refills: 0 | Status: DISCONTINUED | OUTPATIENT
Start: 2024-04-07 | End: 2024-04-09

## 2024-04-07 RX ORDER — DULAGLUTIDE 4.5 MG/.5ML
1 INJECTION, SOLUTION SUBCUTANEOUS
Qty: 0 | Refills: 0 | DISCHARGE

## 2024-04-07 RX ORDER — INSULIN LISPRO 100/ML
VIAL (ML) SUBCUTANEOUS
Refills: 0 | Status: DISCONTINUED | OUTPATIENT
Start: 2024-04-07 | End: 2024-04-09

## 2024-04-07 RX ORDER — DEXTROSE 50 % IN WATER 50 %
15 SYRINGE (ML) INTRAVENOUS ONCE
Refills: 0 | Status: DISCONTINUED | OUTPATIENT
Start: 2024-04-07 | End: 2024-04-09

## 2024-04-07 RX ORDER — DEXTROSE 50 % IN WATER 50 %
12.5 SYRINGE (ML) INTRAVENOUS ONCE
Refills: 0 | Status: DISCONTINUED | OUTPATIENT
Start: 2024-04-07 | End: 2024-04-09

## 2024-04-07 RX ORDER — DEXTROSE 10 % IN WATER 10 %
125 INTRAVENOUS SOLUTION INTRAVENOUS ONCE
Refills: 0 | Status: DISCONTINUED | OUTPATIENT
Start: 2024-04-07 | End: 2024-04-09

## 2024-04-07 RX ORDER — LATANOPROST 0.05 MG/ML
1 SOLUTION/ DROPS OPHTHALMIC; TOPICAL AT BEDTIME
Refills: 0 | Status: DISCONTINUED | OUTPATIENT
Start: 2024-04-07 | End: 2024-04-09

## 2024-04-07 RX ORDER — LATANOPROST 0.05 MG/ML
1 SOLUTION/ DROPS OPHTHALMIC; TOPICAL
Refills: 0 | DISCHARGE

## 2024-04-07 RX ORDER — DEXTROSE 50 % IN WATER 50 %
25 SYRINGE (ML) INTRAVENOUS ONCE
Refills: 0 | Status: DISCONTINUED | OUTPATIENT
Start: 2024-04-07 | End: 2024-04-09

## 2024-04-07 RX ORDER — ONDANSETRON 8 MG/1
4 TABLET, FILM COATED ORAL EVERY 8 HOURS
Refills: 0 | Status: DISCONTINUED | OUTPATIENT
Start: 2024-04-07 | End: 2024-04-09

## 2024-04-07 RX ORDER — ASPIRIN/CALCIUM CARB/MAGNESIUM 324 MG
1 TABLET ORAL
Qty: 0 | Refills: 0 | DISCHARGE

## 2024-04-07 RX ORDER — LISINOPRIL 2.5 MG/1
1 TABLET ORAL
Refills: 0 | DISCHARGE

## 2024-04-07 RX ORDER — ACETAMINOPHEN 500 MG
650 TABLET ORAL EVERY 6 HOURS
Refills: 0 | Status: DISCONTINUED | OUTPATIENT
Start: 2024-04-07 | End: 2024-04-09

## 2024-04-07 RX ADMIN — Medication 81 MILLIGRAM(S): at 12:00

## 2024-04-07 RX ADMIN — ATORVASTATIN CALCIUM 80 MILLIGRAM(S): 80 TABLET, FILM COATED ORAL at 22:15

## 2024-04-07 RX ADMIN — LATANOPROST 1 DROP(S): 0.05 SOLUTION/ DROPS OPHTHALMIC; TOPICAL at 22:15

## 2024-04-07 NOTE — ED CDU PROVIDER SUBSEQUENT DAY NOTE - HISTORY
DEMOND Batista: pt sleeping comfortably in bed, NAD, pt scheduled for stress test.  Will continue to monitor.

## 2024-04-07 NOTE — CONSULT NOTE ADULT - SUBJECTIVE AND OBJECTIVE BOX
Cardiology/Vascular Medicine Inpatient Consultation Note      HISTORY OF PRESENT ILLNESS:    Patient is a 69 yo man with HTN, T2DM presents to the [facility] ED with complaints of chest discomfort over the past one week.    Patient states chest pain has been intermittent.    No recent illnesses.  No recent cardiac assessment (last stress test was many years ago).    Nuclear stress test noted     My review of cardiac telemetry and EKG notes SR,   Unremarkable physical exam  Appears euvolemic    Continue to monitor on cardiac telemetry.  Recommend starting low dose daily ASA and atorvastatin at this time.  Will arrange for an echocardiogram.  Will arrange for LHC tomorrow.    Allergies  No Known Allergies    MEDICATIONS:  aspirin enteric coated 81 milliGRAM(s) Oral daily  acetaminophen     Tablet .. 650 milliGRAM(s) Oral every 6 hours PRN  melatonin 3 milliGRAM(s) Oral at bedtime PRN  ondansetron Injectable 4 milliGRAM(s) IV Push every 8 hours PRN  aluminum hydroxide/magnesium hydroxide/simethicone Suspension 30 milliLiter(s) Oral every 4 hours PRN  atorvastatin 80 milliGRAM(s) Oral at bedtime  dextrose 50% Injectable 25 Gram(s) IV Push once  dextrose 50% Injectable 12.5 Gram(s) IV Push once  dextrose Oral Gel 15 Gram(s) Oral once PRN  glucagon  Injectable 1 milliGRAM(s) IntraMuscular once  insulin lispro (ADMELOG) corrective regimen sliding scale   SubCutaneous at bedtime  insulin lispro (ADMELOG) corrective regimen sliding scale   SubCutaneous three times a day before meals  dextrose 10% Bolus 125 milliLiter(s) IV Bolus once  dextrose 5%. 1000 milliLiter(s) IV Continuous <Continuous>  dextrose 5%. 1000 milliLiter(s) IV Continuous <Continuous>  latanoprost 0.005% Ophthalmic Solution 1 Drop(s) Both EYES at bedtime      PAST MEDICAL & SURGICAL HISTORY:  Hypertension  Diabetes    No significant past surgical history    FAMILY HISTORY:  NC    SOCIAL HISTORY:    NC    REVIEW OF SYSTEMS:  As above, as per chart notes    PHYSICAL EXAM:  T(C): 36.7 (04-07-24 @ 17:57), Max: 36.8 (04-07-24 @ 10:11)  HR: 62 (04-07-24 @ 17:57) (58 - 72)  BP: 145/87 (04-07-24 @ 17:57) (115/75 - 145/87)  RR: 18 (04-07-24 @ 17:57) (15 - 18)  SpO2: 98% (04-07-24 @ 17:57) (98% - 100%)    Appearance: NAD	  HEENT:   No apparent JVD  Cardiovascular: Normal S1 S2  Respiratory: Decreased breath sounds bilaterally  Neurologic: Non-focal  Extremities: No LE edema      LABS:	 	    CBC Full  -  ( 07 Apr 2024 13:37 )  WBC Count : 6.05 K/uL  Hemoglobin : 13.9 g/dL  Hematocrit : 41.9 %  Platelet Count - Automated : 201 K/uL  Mean Cell Volume : 80.7 fL  Mean Cell Hemoglobin : 26.8 pg  Mean Cell Hemoglobin Concentration : 33.2 gm/dL  Auto Neutrophil # : x  Auto Lymphocyte # : x  Auto Monocyte # : x  Auto Eosinophil # : x  Auto Basophil # : x  Auto Neutrophil % : x  Auto Lymphocyte % : x  Auto Monocyte % : x  Auto Eosinophil % : x  Auto Basophil % : x    04-07    139  |  102  |  16  ----------------------------<  204<H>  4.5   |  26  |  1.22  04-06    136  |  103  |  18  ----------------------------<  136<H>  4.5   |  21<L>  |  1.14    Ca    9.1      07 Apr 2024 13:37  Ca    9.2      06 Apr 2024 19:00  Phos  3.9     04-06  Mg     2.20     04-06    TPro  6.8  /  Alb  4.1  /  TBili  0.2  /  DBili  x   /  AST  25  /  ALT  22  /  AlkPhos  63  04-06    < from: Nuclear Stress Test-Exercise.. (04.07.24 @ 08:27) >  ---------------------------------------------------------------------------------------------------------------------------------------------------------  History:      68 year old male with past medical history of hypertension and                diabetes mellitus presents with chest pain.  Risk Factors:Hypertension and diabetes.  Medications:  Not currently taking medication.  Allergies:    None     1. Myocardial Perfusion: Abnormal.   2. The patient underwent stress testing using the standard Abhi protocol.      _ The patient exercised for 5 min 9 sec.      _ The test was stopped due to fatigue.      _ The peak heart rate was 148bpm; 98 % of predicted maximal heart rate for this patient.      _ The patient achieved 7 METS which is consistent with fair exercise capacity considering age and other clinical characteristics.   3. Baseline electrocardiogram: sinus bradycardia at arate of 56 bpm with right bundle branch block, first degree AV block and T wave inversion leads III, V3, V4.   4. Qualitative Perfusion:      - small-sized, mild defect(s) in the inferior wall that is reversible suggestive of ischemia.   5. The left ventricle is normal in function and normal in size. The post stress left ventricular EF is 57 %. The stress end diastolic volume is 90 ml and systolic volume is 39 ml.    ---------------------------------------------------------------------------------------------------------------------------------------------------------       < end of copied text >

## 2024-04-07 NOTE — H&P ADULT - ASSESSMENT
68M PMH hypertension, type 2 diabetes presents for chest discomfort for 1 week found to have reversible ischemia on nuclear stress test pending cards evaluation.

## 2024-04-07 NOTE — PATIENT PROFILE ADULT - FALL HARM RISK - HARM RISK INTERVENTIONS

## 2024-04-07 NOTE — CONSULT NOTE ADULT - ASSESSMENT
HISTORY OF PRESENT ILLNESS:    Patient is a 67 yo man with HTN, T2DM presents to the [facility] ED with complaints of chest discomfort over the past one week.    Patient states chest pain has been intermittent.    No recent illnesses.  No recent cardiac assessment (last stress test was many years ago).    Nuclear stress test noted     My review of cardiac telemetry and EKG notes SR,   Unremarkable physical exam  Appears euvolemic    Continue to monitor on cardiac telemetry.  Recommend starting low dose daily ASA and atorvastatin at this time.  Will arrange for an echocardiogram.  Will arrange for LHC tomorrow.

## 2024-04-07 NOTE — ED CDU PROVIDER DISPOSITION NOTE - ATTENDING CONTRIBUTION TO CARE
68-year-old male past medical history of hypertension, diabetes presents for chest discomfort for 1 week.  Patient states chest pain is substernal, sharp and burning, nonradiating, nonexertional, nonpleuritic.    Reports negative stress test many years ago. In the ED Cxr: negative, Trop negative x 2.     While in CDU patient seen resting comfortably and in NAD. Stress test showed mild defect(s) in the inferior wall that is reversible suggestive of ischemia. Patient will be admitted to Dr. Peewee Long for further management. Patient is ambulating well, hemodynamically stable  agree with above: 67yo M placed in cdu for further eval of CP, trop negative  stress with area of ischemia, pt to be admitted for further management

## 2024-04-07 NOTE — ED CDU PROVIDER DISPOSITION NOTE - CLINICAL COURSE
68-year-old male past medical history of hypertension, diabetes presents for chest discomfort for 1 week.  Patient states chest pain is substernal, sharp and burning, nonradiating, nonexertional, nonpleuritic.    Reports negative stress test many years ago. In the ED Cxr: negative, Trop negative x 2.     While in CDU patient seen resting comfortably and in NAD. Stress test showed mild defect(s) in the inferior wall that is reversible suggestive of ischemia. Patient will be admitted to Dr. Peewee Long for further management. Patient is ambulating well, hemodynamically stable

## 2024-04-07 NOTE — H&P ADULT - NSHPPHYSICALEXAM_GEN_ALL_CORE
VITAL SIGNS:  T(C): 36.6 (04-07-24 @ 14:33), Max: 36.8 (04-06-24 @ 17:54)  T(F): 97.8 (04-07-24 @ 14:33), Max: 98.2 (04-06-24 @ 17:54)  HR: 67 (04-07-24 @ 14:33) (58 - 75)  BP: 132/75 (04-07-24 @ 14:33) (115/75 - 135/72)  BP(mean): --  RR: 18 (04-07-24 @ 14:33) (15 - 18)  SpO2: 99% (04-07-24 @ 14:33) (98% - 100%)  Wt(kg): --    PHYSICAL EXAM:    Constitutional: NAD; resting comfortably in bed  ENT: no nasal discharge; MMM  Neck: supple  Respiratory: CTA B/L; no W/R/R, no retractions  Cardiac: +S1/S2; RRR; no M/R/G  Gastrointestinal: soft, NT/ND; no rebound or guarding  Extremities: WWP, no clubbing or cyanosis; no peripheral edema  Vascular: 2+ radial, DP pulses B/L  Neurologic: AAOx3; CNII-XII grossly intact; no focal deficits  Psychiatric: affect and characteristics of appearance, verbalizations, behaviors are appropriate

## 2024-04-07 NOTE — H&P ADULT - PROBLEM SELECTOR PLAN 4
Pt is unclear on home metformin dosage, says he will have someone bring in medication list. Also on trulicitiy weekly.  - a1c 8.4  Plan:  - ISS  - would benefit from further control of DM

## 2024-04-07 NOTE — H&P ADULT - HISTORY OF PRESENT ILLNESS
68M PMH hypertension, type 2 diabetes presents for chest discomfort for 1 week.  Patient states chest pain has been coming off an on, is substernal, sharp and burning. Nonradiating, nonexertional, nonpleuritic. Feels that he has been belching more and that pain is aggravated by foods or twisting around. Denies shortness of breath, palpitations, leg swelling, hemoptysis, cough, fever, chills, nausea, vomiting, sweating. Chest pain has since resolved.

## 2024-04-07 NOTE — PATIENT PROFILE ADULT - FALL HARM RISK - FALLEN IN PAST
Group Topic: BH Physical Activity    Date: 3/9/2024  Start Time: 1430  End Time: 1500  Facilitators: Gretta Neely    Focus: Fitness Group  Number in attendance: 5    Method: Group   Attendance: Present  Participation: Active  Patient Response: Able to return demonstration  Mood: Normal  Affect: Type: Euthymic (normal mood)   Range: Full (normal)   Congruency: Congruent   Stability: Stable  Behavior/Socialization: Cooperative  Thought Process: Circumstantial  Task Performance: Follows directions  Patient Evaluation: Independent - full participation   No

## 2024-04-07 NOTE — H&P ADULT - NSHPLABSRESULTS_GEN_ALL_CORE
LABS:                         13.9   6.05  )-----------( 201      ( 07 Apr 2024 13:37 )             41.9     04-07    139  |  102  |  16  ----------------------------<  204<H>  4.5   |  26  |  1.22    Ca    9.1      07 Apr 2024 13:37  Phos  3.9     04-06  Mg     2.20     04-06    TPro  6.8  /  Alb  4.1  /  TBili  0.2  /  DBili  x   /  AST  25  /  ALT  22  /  AlkPhos  63  04-06      Urinalysis Basic - ( 07 Apr 2024 13:37 )    Color: x / Appearance: x / SG: x / pH: x  Gluc: 204 mg/dL / Ketone: x  / Bili: x / Urobili: x   Blood: x / Protein: x / Nitrite: x   Leuk Esterase: x / RBC: x / WBC x   Sq Epi: x / Non Sq Epi: x / Bacteria: x                RADIOLOGY, EKG & ADDITIONAL TESTS: Reviewed.

## 2024-04-07 NOTE — H&P ADULT - TIME BILLING
Time-based billing (NON-critical care).     More than 50% of the visit was spent counseling and / or coordinating care by the attending physician.      The necessity of the time spent during the encounter on this date of service was due to: documentation in [place], reviewing chart and coordinating care with patient/ACPs and interdisciplinary staff (such as , social workers, etc) as well as reviewing vitals, laboratory data, radiology, medication list, and prior records. Interventions were performed as documented above.

## 2024-04-07 NOTE — H&P ADULT - PROBLEM SELECTOR PLAN 1
Patient presented with atypical intermittent chest pain that he described as substernal, sharp and burning. Nonradiating, nonexertional, nonpleuritic. Feels that he has been belching more and that pain is aggravated by foods or twisting around. Denies shortness of breath, palpitations, leg swelling, hemoptysis, cough, fever, chills, nausea, vomiting, sweating. Chest pain has since resolved.  - EKG: NSR without acute ischemic chages  - trop 13 -> 10  - LDL 68, TSH wnl  - CXR: no acute cardiopulmonary process  - NST: inferior wall reversible ischemia  Plan:  - unclear if chest pain is c/w angina. however given positive stress test, will f/u cards recs for C  - continue lipitor 80mg (LDL at goal)  - continue aspirin 81mg  - control DM as below

## 2024-04-08 LAB
ANION GAP SERPL CALC-SCNC: 14 MMOL/L — SIGNIFICANT CHANGE UP (ref 7–14)
APTT BLD: 30.5 SEC — SIGNIFICANT CHANGE UP (ref 24.5–35.6)
BUN SERPL-MCNC: 17 MG/DL — SIGNIFICANT CHANGE UP (ref 7–23)
CALCIUM SERPL-MCNC: 8.9 MG/DL — SIGNIFICANT CHANGE UP (ref 8.4–10.5)
CHLORIDE SERPL-SCNC: 102 MMOL/L — SIGNIFICANT CHANGE UP (ref 98–107)
CO2 SERPL-SCNC: 22 MMOL/L — SIGNIFICANT CHANGE UP (ref 22–31)
CREAT SERPL-MCNC: 1.11 MG/DL — SIGNIFICANT CHANGE UP (ref 0.5–1.3)
EGFR: 72 ML/MIN/1.73M2 — SIGNIFICANT CHANGE UP
GLUCOSE BLDC GLUCOMTR-MCNC: 126 MG/DL — HIGH (ref 70–99)
GLUCOSE BLDC GLUCOMTR-MCNC: 130 MG/DL — HIGH (ref 70–99)
GLUCOSE BLDC GLUCOMTR-MCNC: 133 MG/DL — HIGH (ref 70–99)
GLUCOSE BLDC GLUCOMTR-MCNC: 185 MG/DL — HIGH (ref 70–99)
GLUCOSE BLDC GLUCOMTR-MCNC: 205 MG/DL — HIGH (ref 70–99)
GLUCOSE SERPL-MCNC: 133 MG/DL — HIGH (ref 70–99)
HCT VFR BLD CALC: 43.6 % — SIGNIFICANT CHANGE UP (ref 39–50)
HCV AB S/CO SERPL IA: 0.23 S/CO — SIGNIFICANT CHANGE UP (ref 0–0.99)
HCV AB SERPL-IMP: SIGNIFICANT CHANGE UP
HGB BLD-MCNC: 14.7 G/DL — SIGNIFICANT CHANGE UP (ref 13–17)
INR BLD: 0.98 RATIO — SIGNIFICANT CHANGE UP (ref 0.85–1.18)
MCHC RBC-ENTMCNC: 27.1 PG — SIGNIFICANT CHANGE UP (ref 27–34)
MCHC RBC-ENTMCNC: 33.7 GM/DL — SIGNIFICANT CHANGE UP (ref 32–36)
MCV RBC AUTO: 80.3 FL — SIGNIFICANT CHANGE UP (ref 80–100)
NRBC # BLD: 0 /100 WBCS — SIGNIFICANT CHANGE UP (ref 0–0)
NRBC # FLD: 0 K/UL — SIGNIFICANT CHANGE UP (ref 0–0)
PLATELET # BLD AUTO: 213 K/UL — SIGNIFICANT CHANGE UP (ref 150–400)
POTASSIUM SERPL-MCNC: 4.3 MMOL/L — SIGNIFICANT CHANGE UP (ref 3.5–5.3)
POTASSIUM SERPL-SCNC: 4.3 MMOL/L — SIGNIFICANT CHANGE UP (ref 3.5–5.3)
PROTHROM AB SERPL-ACNC: 11.1 SEC — SIGNIFICANT CHANGE UP (ref 9.5–13)
RBC # BLD: 5.43 M/UL — SIGNIFICANT CHANGE UP (ref 4.2–5.8)
RBC # FLD: 13.2 % — SIGNIFICANT CHANGE UP (ref 10.3–14.5)
SODIUM SERPL-SCNC: 138 MMOL/L — SIGNIFICANT CHANGE UP (ref 135–145)
WBC # BLD: 6.76 K/UL — SIGNIFICANT CHANGE UP (ref 3.8–10.5)
WBC # FLD AUTO: 6.76 K/UL — SIGNIFICANT CHANGE UP (ref 3.8–10.5)

## 2024-04-08 PROCEDURE — 93010 ELECTROCARDIOGRAM REPORT: CPT

## 2024-04-08 PROCEDURE — 99232 SBSQ HOSP IP/OBS MODERATE 35: CPT

## 2024-04-08 RX ORDER — CLOPIDOGREL BISULFATE 75 MG/1
75 TABLET, FILM COATED ORAL DAILY
Refills: 0 | Status: DISCONTINUED | OUTPATIENT
Start: 2024-04-09 | End: 2024-04-09

## 2024-04-08 RX ORDER — SODIUM CHLORIDE 9 MG/ML
1000 INJECTION INTRAMUSCULAR; INTRAVENOUS; SUBCUTANEOUS
Refills: 0 | Status: DISCONTINUED | OUTPATIENT
Start: 2024-04-08 | End: 2024-04-09

## 2024-04-08 RX ADMIN — SODIUM CHLORIDE 100 MILLILITER(S): 9 INJECTION INTRAMUSCULAR; INTRAVENOUS; SUBCUTANEOUS at 14:48

## 2024-04-08 RX ADMIN — Medication 3 MILLIGRAM(S): at 21:24

## 2024-04-08 RX ADMIN — Medication 2: at 15:40

## 2024-04-08 RX ADMIN — ATORVASTATIN CALCIUM 80 MILLIGRAM(S): 80 TABLET, FILM COATED ORAL at 21:23

## 2024-04-08 RX ADMIN — Medication 81 MILLIGRAM(S): at 11:15

## 2024-04-08 RX ADMIN — LATANOPROST 1 DROP(S): 0.05 SOLUTION/ DROPS OPHTHALMIC; TOPICAL at 21:24

## 2024-04-08 NOTE — CHART NOTE - NSCHARTNOTEFT_GEN_A_CORE
ACP NIGHT COVERAGE    Pt went for cath procedure today with Right radial artery accessed. Pt denies SOB, CP, swelling, edema, paresthesia distal to site or pain at site. Site checked. No ecchymosis, hematoma, or swelling within access point. Active and passive ROM of R shoulder, R elbow, R wrist, and fingers without deficits. Motor and Sensory intact. 2+ peripheral pulses intact. Capillary refill less than 2 seconds. Pt educated to look out for bruising, swelling, tingling, and numbness of site/distal to site and notify RN. Will continue to monitor overnight.     Denver Zhou PA-C  [id number]

## 2024-04-08 NOTE — PROGRESS NOTE ADULT - SUBJECTIVE AND OBJECTIVE BOX
Cardiology/Vascular Medicine Inpatient Progress Note    No new complaints.  No significant events noted on cardiac telemetry.      MEDICATIONS:  aspirin enteric coated 81 milliGRAM(s) Oral daily  acetaminophen     Tablet .. 650 milliGRAM(s) Oral every 6 hours PRN  melatonin 3 milliGRAM(s) Oral at bedtime PRN  ondansetron Injectable 4 milliGRAM(s) IV Push every 8 hours PRN  aluminum hydroxide/magnesium hydroxide/simethicone Suspension 30 milliLiter(s) Oral every 4 hours PRN  atorvastatin 80 milliGRAM(s) Oral at bedtime  dextrose 50% Injectable 25 Gram(s) IV Push once  dextrose 50% Injectable 12.5 Gram(s) IV Push once  dextrose Oral Gel 15 Gram(s) Oral once PRN  glucagon  Injectable 1 milliGRAM(s) IntraMuscular once  insulin lispro (ADMELOG) corrective regimen sliding scale   SubCutaneous at bedtime  insulin lispro (ADMELOG) corrective regimen sliding scale   SubCutaneous three times a day before meals  dextrose 10% Bolus 125 milliLiter(s) IV Bolus once  dextrose 5%. 1000 milliLiter(s) IV Continuous <Continuous>  dextrose 5%. 1000 milliLiter(s) IV Continuous <Continuous>  latanoprost 0.005% Ophthalmic Solution 1 Drop(s) Both EYES at bedtime    PHYSICAL EXAM:  T(C): 36.7 (04-07-24 @ 17:57), Max: 36.8 (04-07-24 @ 10:11)  HR: 62 (04-07-24 @ 17:57) (58 - 72)  BP: 145/87 (04-07-24 @ 17:57) (115/75 - 145/87)  RR: 18 (04-07-24 @ 17:57) (15 - 18)  SpO2: 98% (04-07-24 @ 17:57) (98% - 100%)    Appearance: NAD	  HEENT:   No apparent JVD  Cardiovascular: Normal S1 S2  Respiratory: Decreased breath sounds bilaterally  Neurologic: Non-focal  Extremities: No LE edema      LABS:	 	    CBC Full  -  ( 07 Apr 2024 13:37 )  WBC Count : 6.05 K/uL  Hemoglobin : 13.9 g/dL  Hematocrit : 41.9 %  Platelet Count - Automated : 201 K/uL  Mean Cell Volume : 80.7 fL  Mean Cell Hemoglobin : 26.8 pg  Mean Cell Hemoglobin Concentration : 33.2 gm/dL  Auto Neutrophil # : x  Auto Lymphocyte # : x  Auto Monocyte # : x  Auto Eosinophil # : x  Auto Basophil # : x  Auto Neutrophil % : x  Auto Lymphocyte % : x  Auto Monocyte % : x  Auto Eosinophil % : x  Auto Basophil % : x    04-07    139  |  102  |  16  ----------------------------<  204<H>  4.5   |  26  |  1.22  04-06    136  |  103  |  18  ----------------------------<  136<H>  4.5   |  21<L>  |  1.14    Ca    9.1      07 Apr 2024 13:37  Ca    9.2      06 Apr 2024 19:00  Phos  3.9     04-06  Mg     2.20     04-06    TPro  6.8  /  Alb  4.1  /  TBili  0.2  /  DBili  x   /  AST  25  /  ALT  22  /  AlkPhos  63  04-06    < from: Nuclear Stress Test-Exercise.. (04.07.24 @ 08:27) >  ---------------------------------------------------------------------------------------------------------------------------------------------------------  History:      68 year old male with past medical history of hypertension and                diabetes mellitus presents with chest pain.  Risk Factors:Hypertension and diabetes.  Medications:  Not currently taking medication.  Allergies:    None     1. Myocardial Perfusion: Abnormal.   2. The patient underwent stress testing using the standard Abhi protocol.      _ The patient exercised for 5 min 9 sec.      _ The test was stopped due to fatigue.      _ The peak heart rate was 148bpm; 98 % of predicted maximal heart rate for this patient.      _ The patient achieved 7 METS which is consistent with fair exercise capacity considering age and other clinical characteristics.   3. Baseline electrocardiogram: sinus bradycardia at arate of 56 bpm with right bundle branch block, first degree AV block and T wave inversion leads III, V3, V4.   4. Qualitative Perfusion:      - small-sized, mild defect(s) in the inferior wall that is reversible suggestive of ischemia.   5. The left ventricle is normal in function and normal in size. The post stress left ventricular EF is 57 %. The stress end diastolic volume is 90 ml and systolic volume is 39 ml.    ---------------------------------------------------------------------------------------------------------------------------------------------------------       < end of copied text >

## 2024-04-08 NOTE — PROGRESS NOTE ADULT - ASSESSMENT
HISTORY OF PRESENT ILLNESS:    Patient is a 67 yo man with HTN, T2DM presents to the [facility] ED with complaints of chest discomfort over the past one week.    Patient states chest pain has been intermittent.    No recent illnesses.  No recent cardiac assessment (last stress test was many years ago).    Nuclear stress test noted     My review of cardiac telemetry and EKG notes SR,   Unremarkable physical exam  Appears euvolemic    Continue to monitor on cardiac telemetry.  Recommend starting low dose daily ASA and atorvastatin at this time.  Will arrange for an echocardiogram.  Plan for LHC today.  Will arrange for LHC tomorrow.

## 2024-04-09 ENCOUNTER — TRANSCRIPTION ENCOUNTER (OUTPATIENT)
Age: 69
End: 2024-04-09

## 2024-04-09 ENCOUNTER — RESULT REVIEW (OUTPATIENT)
Age: 69
End: 2024-04-09

## 2024-04-09 VITALS — WEIGHT: 179.9 LBS

## 2024-04-09 LAB
ANION GAP SERPL CALC-SCNC: 15 MMOL/L — HIGH (ref 7–14)
BASOPHILS # BLD AUTO: 0.02 K/UL — SIGNIFICANT CHANGE UP (ref 0–0.2)
BASOPHILS NFR BLD AUTO: 0.3 % — SIGNIFICANT CHANGE UP (ref 0–2)
BUN SERPL-MCNC: 18 MG/DL — SIGNIFICANT CHANGE UP (ref 7–23)
CALCIUM SERPL-MCNC: 9 MG/DL — SIGNIFICANT CHANGE UP (ref 8.4–10.5)
CHLORIDE SERPL-SCNC: 101 MMOL/L — SIGNIFICANT CHANGE UP (ref 98–107)
CO2 SERPL-SCNC: 21 MMOL/L — LOW (ref 22–31)
CREAT SERPL-MCNC: 1.11 MG/DL — SIGNIFICANT CHANGE UP (ref 0.5–1.3)
EGFR: 72 ML/MIN/1.73M2 — SIGNIFICANT CHANGE UP
EOSINOPHIL # BLD AUTO: 0.13 K/UL — SIGNIFICANT CHANGE UP (ref 0–0.5)
EOSINOPHIL NFR BLD AUTO: 1.7 % — SIGNIFICANT CHANGE UP (ref 0–6)
GLUCOSE BLDC GLUCOMTR-MCNC: 132 MG/DL — HIGH (ref 70–99)
GLUCOSE BLDC GLUCOMTR-MCNC: 187 MG/DL — HIGH (ref 70–99)
GLUCOSE SERPL-MCNC: 135 MG/DL — HIGH (ref 70–99)
HCT VFR BLD CALC: 42.9 % — SIGNIFICANT CHANGE UP (ref 39–50)
HGB BLD-MCNC: 14.4 G/DL — SIGNIFICANT CHANGE UP (ref 13–17)
IANC: 4.46 K/UL — SIGNIFICANT CHANGE UP (ref 1.8–7.4)
IMM GRANULOCYTES NFR BLD AUTO: 0.1 % — SIGNIFICANT CHANGE UP (ref 0–0.9)
LYMPHOCYTES # BLD AUTO: 2.29 K/UL — SIGNIFICANT CHANGE UP (ref 1–3.3)
LYMPHOCYTES # BLD AUTO: 30.7 % — SIGNIFICANT CHANGE UP (ref 13–44)
MAGNESIUM SERPL-MCNC: 2.3 MG/DL — SIGNIFICANT CHANGE UP (ref 1.6–2.6)
MCHC RBC-ENTMCNC: 27.1 PG — SIGNIFICANT CHANGE UP (ref 27–34)
MCHC RBC-ENTMCNC: 33.6 GM/DL — SIGNIFICANT CHANGE UP (ref 32–36)
MCV RBC AUTO: 80.6 FL — SIGNIFICANT CHANGE UP (ref 80–100)
MONOCYTES # BLD AUTO: 0.56 K/UL — SIGNIFICANT CHANGE UP (ref 0–0.9)
MONOCYTES NFR BLD AUTO: 7.5 % — SIGNIFICANT CHANGE UP (ref 2–14)
MRSA PCR RESULT.: SIGNIFICANT CHANGE UP
NEUTROPHILS # BLD AUTO: 4.46 K/UL — SIGNIFICANT CHANGE UP (ref 1.8–7.4)
NEUTROPHILS NFR BLD AUTO: 59.7 % — SIGNIFICANT CHANGE UP (ref 43–77)
NRBC # BLD: 0 /100 WBCS — SIGNIFICANT CHANGE UP (ref 0–0)
NRBC # FLD: 0 K/UL — SIGNIFICANT CHANGE UP (ref 0–0)
PHOSPHATE SERPL-MCNC: 4 MG/DL — SIGNIFICANT CHANGE UP (ref 2.5–4.5)
PLATELET # BLD AUTO: 237 K/UL — SIGNIFICANT CHANGE UP (ref 150–400)
POTASSIUM SERPL-MCNC: 4.3 MMOL/L — SIGNIFICANT CHANGE UP (ref 3.5–5.3)
POTASSIUM SERPL-SCNC: 4.3 MMOL/L — SIGNIFICANT CHANGE UP (ref 3.5–5.3)
RBC # BLD: 5.32 M/UL — SIGNIFICANT CHANGE UP (ref 4.2–5.8)
RBC # FLD: 13.2 % — SIGNIFICANT CHANGE UP (ref 10.3–14.5)
S AUREUS DNA NOSE QL NAA+PROBE: SIGNIFICANT CHANGE UP
SODIUM SERPL-SCNC: 137 MMOL/L — SIGNIFICANT CHANGE UP (ref 135–145)
WBC # BLD: 7.47 K/UL — SIGNIFICANT CHANGE UP (ref 3.8–10.5)
WBC # FLD AUTO: 7.47 K/UL — SIGNIFICANT CHANGE UP (ref 3.8–10.5)

## 2024-04-09 PROCEDURE — 99239 HOSP IP/OBS DSCHRG MGMT >30: CPT

## 2024-04-09 PROCEDURE — 93306 TTE W/DOPPLER COMPLETE: CPT | Mod: 26

## 2024-04-09 RX ORDER — TADALAFIL 10 MG/1
1 TABLET, FILM COATED ORAL
Refills: 0 | DISCHARGE

## 2024-04-09 RX ORDER — CLOPIDOGREL BISULFATE 75 MG/1
1 TABLET, FILM COATED ORAL
Qty: 90 | Refills: 0
Start: 2024-04-09 | End: 2024-07-07

## 2024-04-09 RX ORDER — TADALAFIL 10 MG/1
1 TABLET, FILM COATED ORAL
Qty: 30 | Refills: 0
Start: 2024-04-09 | End: 2024-05-08

## 2024-04-09 RX ORDER — ATORVASTATIN CALCIUM 80 MG/1
1 TABLET, FILM COATED ORAL
Qty: 30 | Refills: 0
Start: 2024-04-09 | End: 2024-05-08

## 2024-04-09 RX ADMIN — Medication 81 MILLIGRAM(S): at 11:29

## 2024-04-09 RX ADMIN — Medication 1: at 11:54

## 2024-04-09 RX ADMIN — CLOPIDOGREL BISULFATE 75 MILLIGRAM(S): 75 TABLET, FILM COATED ORAL at 11:28

## 2024-04-09 NOTE — DIETITIAN INITIAL EVALUATION ADULT - PERTINENT MEDS FT
MEDICATIONS  (STANDING):  aspirin enteric coated 81 milliGRAM(s) Oral daily  atorvastatin 80 milliGRAM(s) Oral at bedtime  clopidogrel Tablet 75 milliGRAM(s) Oral daily  dextrose 10% Bolus 125 milliLiter(s) IV Bolus once  dextrose 5%. 1000 milliLiter(s) (50 mL/Hr) IV Continuous <Continuous>  dextrose 5%. 1000 milliLiter(s) (100 mL/Hr) IV Continuous <Continuous>  dextrose 50% Injectable 25 Gram(s) IV Push once  dextrose 50% Injectable 12.5 Gram(s) IV Push once  glucagon  Injectable 1 milliGRAM(s) IntraMuscular once  insulin lispro (ADMELOG) corrective regimen sliding scale   SubCutaneous three times a day before meals  insulin lispro (ADMELOG) corrective regimen sliding scale   SubCutaneous at bedtime  latanoprost 0.005% Ophthalmic Solution 1 Drop(s) Both EYES at bedtime  sodium chloride 0.9%. 1000 milliLiter(s) (100 mL/Hr) IV Continuous <Continuous>    MEDICATIONS  (PRN):  acetaminophen     Tablet .. 650 milliGRAM(s) Oral every 6 hours PRN Temp greater or equal to 38C (100.4F), Mild Pain (1 - 3)  aluminum hydroxide/magnesium hydroxide/simethicone Suspension 30 milliLiter(s) Oral every 4 hours PRN Dyspepsia  dextrose Oral Gel 15 Gram(s) Oral once PRN Blood Glucose LESS THAN 70 milliGRAM(s)/deciliter  melatonin 3 milliGRAM(s) Oral at bedtime PRN Insomnia  ondansetron Injectable 4 milliGRAM(s) IV Push every 8 hours PRN Nausea and/or Vomiting

## 2024-04-09 NOTE — DISCHARGE NOTE PROVIDER - NSDCFUSCHEDAPPT_GEN_ALL_CORE_FT
[facility]  [address] [address]  Scheduled Appointment: 05/23/2024    Javier Saleem  [facility]  [address] [address]  Scheduled Appointment: 05/23/2024

## 2024-04-09 NOTE — DISCHARGE NOTE PROVIDER - NSDCCPCAREPLAN_GEN_ALL_CORE_FT
PRINCIPAL DISCHARGE DIAGNOSIS  Diagnosis: Chest pain  Assessment and Plan of Treatment: You came to the hospital with chest pain. You had an angiogram performed and had a cardiac stent placed. To keep this stent open, please take ASPIRIN and PLAVIX daily. DO NOT STOP THESE MEDICATIONS without speaking to your cardiologist. Follow up with cardiology in 1-2 weeks.

## 2024-04-09 NOTE — DIETITIAN INITIAL EVALUATION ADULT - ORAL INTAKE PTA/DIET HISTORY
Pt reports good appetite at home but non-compliant to CHO consistent diet. UBW-180# per pt. and stable.

## 2024-04-09 NOTE — PHARMACOTHERAPY INTERVENTION NOTE - COMMENTS
Discharge medications reviewed with patient. Outpatient medication schedule was discussed in detail including: medication name, indication, dose, administration times, treatment duration, side effects and special instructions. Reviewed signs and symptoms of bleeding with Plavix and when to contact a provider. Patient questions and concerns were answered and addressed. Patient demonstrated understanding. Informed patient that medication list may change prior to discharge. Patient provided with educational handout.     Roxanna Condon, PharmD, Clinical Pharmacy Specialist, [id number]

## 2024-04-09 NOTE — DISCHARGE NOTE PROVIDER - ATTENDING DISCHARGE PHYSICAL EXAMINATION:
Patient seen and examined during morning rounds.  Patient is s/p PCI to RCA.  Clinically stable from the cardiac standpoint.  Medications reviewed with patient.  Patient may be discharged to home, and has been advised to f/u in 2-4 weeks after discharge.

## 2024-04-09 NOTE — DISCHARGE NOTE NURSING/CASE MANAGEMENT/SOCIAL WORK - NSDCPEFALRISK_GEN_ALL_CORE
For information on Fall & Injury Prevention, visit: [email or web address] OR  [email or web address] OR  https://www.cdc.gov/steadi/patient.html

## 2024-04-09 NOTE — DIETITIAN INITIAL EVALUATION ADULT - OTHER INFO
69 yo man with HTN, T2DM presents to the [facility] ED with complaints of chest discomfort over the past one week.     Pt seen and reports 75% intake of meals. No GI distress (nausea/vomiting/diarrhea/constipation.) at present. No difficulty chewing or swallowing at this time. Noted skin ecchymosis, no edema per nursing flow sheet. Labs reviewed. A1c- 8.4% (H) (4/7/24).   RD provided the patient with extensive verbal and written DM diet education; including, carb counting, label reading, meal planning, pre-prandial and post-prandial finger stick goals, and HbA1c goal. Patient was also made aware of the physiological implications of poor glycemic control. Also discussed Heart Healthy diet recommendations. Importance of having consistent carbohydrate with protein at each meals emphasized.

## 2024-04-09 NOTE — DISCHARGE NOTE PROVIDER - CARE PROVIDER_API CALL
Peewee Long  Cardiology  [address] [place] [address], [address]  [place] [address]  Phone: [phone]  Fax: [phone]  Follow Up Time:

## 2024-04-09 NOTE — DISCHARGE NOTE PROVIDER - NSDCMRMEDTOKEN_GEN_ALL_CORE_FT
atorvastatin 80 mg oral tablet: 1 tab(s) orally once a day (at bedtime)  clopidogrel 75 mg oral tablet: 1 tab(s) orally once a day  Ecotrin Adult Low Strength 81 mg oral delayed release tablet: 1 tab(s) orally once a day  latanoprost 0.005% ophthalmic solution: 1 drop(s) in each eye once a day (at bedtime)  lisinopril 10 mg oral tablet: 1 tab(s) orally once a day  Tadalafil (Eqv-Cialis) 20 mg oral tablet: 1 tab(s) orally once a day as needed for take as instructed  Trulicity Pen: 1 dose(s) subcutaneous once a week

## 2024-04-09 NOTE — DISCHARGE NOTE NURSING/CASE MANAGEMENT/SOCIAL WORK - PATIENT PORTAL LINK FT
You can access the FollowMyHealth Patient Portal offered by [facility] by registering at the following website: [email or web address]. By joining [employer]’s FollowMyHealth portal, you will also be able to view your health information using other applications (apps) compatible with our system.

## 2024-04-09 NOTE — DISCHARGE NOTE PROVIDER - HOSPITAL COURSE
Patient is a 69 yo man with HTN, T2DM presents to the [facility] ED with complaints of chest discomfort over the past one week. TTE with grade 1 diastolic dysfunction. NST abnormal. - 4/8 Cardiac cath - RCA 70%=> stent x1. RRA access,  ASA + Plavix.     Case discussed with Dr. Long on 4/9/24. The patient is medically stable for discharge.

## 2024-04-09 NOTE — PHARMACOTHERAPY INTERVENTION NOTE - OUTCOME
Detail Level: Detailed
Quality 110: Preventive Care And Screening: Influenza Immunization: Influenza immunization was not ordered or administered, reason not given
accepted

## 2024-04-09 NOTE — DIETITIAN INITIAL EVALUATION ADULT - PERTINENT LABORATORY DATA
04-09    137  |  101  |  18  ----------------------------<  135<H>  4.3   |  21<L>  |  1.11    Ca    9.0      09 Apr 2024 04:05  Phos  4.0     04-09  Mg     2.30     04-09    POCT Blood Glucose.: 187 mg/dL (04-09-24 @ 11:25)  A1C with Estimated Average Glucose Result: 8.4 % (04-07-24 @ 13:37)  A1C with Estimated Average Glucose Result: 8.4 % (04-06-24 @ 19:00)

## 2024-04-09 NOTE — DISCHARGE NOTE PROVIDER - NSRESEARCHGRANT_OVERRIDEREC_GEN_A_CORE
patient noted with last admission weight of  309lbs, usual weight 300lbs, current weight 300lbs. Patient admitted with sepsis related to wounds, pt is inactive at home (noted chronic immobility), she does not cook and relies on convenience cooked meals that she orders in. Patient shows improvement in A1C from 10% in July 2021 to current 8.6%. Patient states she is trying harder to maintain regular glucose testing and medication schedule. Pt denies chewing or swallowing difficulty, abdominal pain
IMPROVE-DD Application Not Available
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
## 2024-05-01 ENCOUNTER — APPOINTMENT (OUTPATIENT)
Dept: CARDIOLOGY | Facility: CLINIC | Age: 69
End: 2024-05-01
Payer: COMMERCIAL

## 2024-05-01 ENCOUNTER — NON-APPOINTMENT (OUTPATIENT)
Age: 69
End: 2024-05-01

## 2024-05-01 VITALS — SYSTOLIC BLOOD PRESSURE: 128 MMHG | DIASTOLIC BLOOD PRESSURE: 79 MMHG

## 2024-05-01 VITALS — HEIGHT: 69 IN | WEIGHT: 179 LBS | HEART RATE: 75 BPM | OXYGEN SATURATION: 99 % | BODY MASS INDEX: 26.51 KG/M2

## 2024-05-01 DIAGNOSIS — I25.10 ATHEROSCLEROTIC HEART DISEASE OF NATIVE CORONARY ARTERY W/OUT ANGINA PECTORIS: ICD-10-CM

## 2024-05-01 DIAGNOSIS — R07.9 CHEST PAIN, UNSPECIFIED: ICD-10-CM

## 2024-05-01 PROCEDURE — G2211 COMPLEX E/M VISIT ADD ON: CPT

## 2024-05-01 PROCEDURE — 99215 OFFICE O/P EST HI 40 MIN: CPT

## 2024-05-01 PROCEDURE — 93000 ELECTROCARDIOGRAM COMPLETE: CPT

## 2024-05-01 RX ORDER — ATORVASTATIN CALCIUM 80 MG/1
80 TABLET, FILM COATED ORAL DAILY
Qty: 90 | Refills: 3 | Status: ACTIVE | COMMUNITY
Start: 1900-01-01 | End: 1900-01-01

## 2024-05-01 RX ORDER — TADALAFIL 20 MG/1
20 TABLET ORAL DAILY
Refills: 0 | Status: ACTIVE | COMMUNITY

## 2024-05-01 RX ORDER — METFORMIN HYDROCHLORIDE 500 MG/1
500 TABLET, COATED ORAL
Refills: 0 | Status: DISCONTINUED | COMMUNITY
End: 2024-05-01

## 2024-05-01 RX ORDER — LATANOPROST/PF 0.005 %
0.01 DROPS OPHTHALMIC (EYE) DAILY
Refills: 0 | Status: ACTIVE | COMMUNITY

## 2024-05-01 RX ORDER — CLOPIDOGREL BISULFATE 75 MG/1
75 TABLET, FILM COATED ORAL DAILY
Qty: 90 | Refills: 3 | Status: ACTIVE | COMMUNITY
Start: 1900-01-01 | End: 1900-01-01

## 2024-05-01 RX ORDER — LISINOPRIL 10 MG/1
10 TABLET ORAL DAILY
Qty: 90 | Refills: 3 | Status: ACTIVE | COMMUNITY
Start: 1900-01-01 | End: 1900-01-01

## 2024-05-01 RX ORDER — DULAGLUTIDE 1.5 MG/.5ML
1.5 INJECTION, SOLUTION SUBCUTANEOUS WEEKLY
Refills: 0 | Status: ACTIVE | COMMUNITY

## 2024-05-23 ENCOUNTER — APPOINTMENT (OUTPATIENT)
Dept: OPHTHALMOLOGY | Facility: CLINIC | Age: 69
End: 2024-05-23

## 2024-05-23 ENCOUNTER — APPOINTMENT (OUTPATIENT)
Dept: OPHTHALMOLOGY | Facility: CLINIC | Age: 69
End: 2024-05-23
Payer: COMMERCIAL

## 2024-05-23 ENCOUNTER — NON-APPOINTMENT (OUTPATIENT)
Age: 69
End: 2024-05-23

## 2024-05-23 PROCEDURE — 92012 INTRM OPH EXAM EST PATIENT: CPT

## 2024-05-23 PROCEDURE — 92083 EXTENDED VISUAL FIELD XM: CPT

## 2024-09-05 ENCOUNTER — NON-APPOINTMENT (OUTPATIENT)
Age: 69
End: 2024-09-05

## 2024-09-05 ENCOUNTER — APPOINTMENT (OUTPATIENT)
Dept: CARDIOLOGY | Facility: CLINIC | Age: 69
End: 2024-09-05
Payer: COMMERCIAL

## 2024-09-05 VITALS
BODY MASS INDEX: 26.66 KG/M2 | DIASTOLIC BLOOD PRESSURE: 73 MMHG | HEIGHT: 69 IN | SYSTOLIC BLOOD PRESSURE: 129 MMHG | WEIGHT: 180 LBS | HEART RATE: 70 BPM | OXYGEN SATURATION: 95 %

## 2024-09-05 DIAGNOSIS — I25.10 ATHEROSCLEROTIC HEART DISEASE OF NATIVE CORONARY ARTERY W/OUT ANGINA PECTORIS: ICD-10-CM

## 2024-09-05 DIAGNOSIS — I10 ESSENTIAL (PRIMARY) HYPERTENSION: ICD-10-CM

## 2024-09-05 DIAGNOSIS — Z13.6 ENCOUNTER FOR SCREENING FOR CARDIOVASCULAR DISORDERS: ICD-10-CM

## 2024-09-05 DIAGNOSIS — R07.9 CHEST PAIN, UNSPECIFIED: ICD-10-CM

## 2024-09-05 PROCEDURE — 99214 OFFICE O/P EST MOD 30 MIN: CPT | Mod: 25

## 2024-09-05 PROCEDURE — G2211 COMPLEX E/M VISIT ADD ON: CPT | Mod: NC

## 2024-09-05 PROCEDURE — 93000 ELECTROCARDIOGRAM COMPLETE: CPT

## 2024-09-05 RX ORDER — METOPROLOL SUCCINATE 25 MG/1
25 TABLET, EXTENDED RELEASE ORAL DAILY
Qty: 90 | Refills: 3 | Status: ACTIVE | COMMUNITY
Start: 2024-09-05 | End: 1900-01-01

## 2024-09-16 NOTE — PATIENT PROFILE ADULT. - HEALTHCARE QUESTIONS, PROFILE
Render Risk Assessment In Note?: no
Additional Notes: Biopsy suture removed. Discussed differential of androgenetic alopecia vs resolving telogen effluvium. She will continue low dose oral minoxidil. If no side effects after another 3 weeks, can increase dose to 1 whole tablet qDay. If side effects experienced, call the office.
Detail Level: Simple
none

## 2024-10-01 ENCOUNTER — NON-APPOINTMENT (OUTPATIENT)
Age: 69
End: 2024-10-01

## 2024-10-01 ENCOUNTER — APPOINTMENT (OUTPATIENT)
Dept: OPHTHALMOLOGY | Facility: CLINIC | Age: 69
End: 2024-10-01
Payer: COMMERCIAL

## 2024-10-01 PROCEDURE — 92133 CPTRZD OPH DX IMG PST SGM ON: CPT

## 2024-10-01 PROCEDURE — 92012 INTRM OPH EXAM EST PATIENT: CPT

## 2024-11-04 ENCOUNTER — INPATIENT (INPATIENT)
Facility: HOSPITAL | Age: 69
LOS: 1 days | Discharge: HOME | End: 2024-11-06
Attending: HOSPITALIST | Admitting: HOSPITALIST
Payer: COMMERCIAL

## 2024-11-04 ENCOUNTER — EMERGENCY (EMERGENCY)
Facility: HOSPITAL | Age: 69
LOS: 1 days | Discharge: HOME | End: 2024-11-04
Attending: STUDENT IN AN ORGANIZED HEALTH CARE EDUCATION/TRAINING PROGRAM | Admitting: PERSONAL EMERGENCY RESPONSE ATTENDANT
Payer: COMMERCIAL

## 2024-11-04 VITALS
HEIGHT: 67 IN | SYSTOLIC BLOOD PRESSURE: 144 MMHG | OXYGEN SATURATION: 100 % | DIASTOLIC BLOOD PRESSURE: 80 MMHG | HEART RATE: 79 BPM | RESPIRATION RATE: 17 BRPM | TEMPERATURE: 98 F | WEIGHT: 195.11 LBS

## 2024-11-04 VITALS
HEART RATE: 97 BPM | SYSTOLIC BLOOD PRESSURE: 157 MMHG | HEIGHT: 67 IN | TEMPERATURE: 98 F | DIASTOLIC BLOOD PRESSURE: 96 MMHG | OXYGEN SATURATION: 99 % | WEIGHT: 179.9 LBS | RESPIRATION RATE: 18 BRPM

## 2024-11-04 VITALS
HEART RATE: 89 BPM | DIASTOLIC BLOOD PRESSURE: 87 MMHG | SYSTOLIC BLOOD PRESSURE: 143 MMHG | RESPIRATION RATE: 20 BRPM | TEMPERATURE: 99 F | OXYGEN SATURATION: 100 %

## 2024-11-04 LAB
ALBUMIN SERPL ELPH-MCNC: 4.2 G/DL — SIGNIFICANT CHANGE UP (ref 3.3–5)
ALBUMIN SERPL ELPH-MCNC: 4.6 G/DL — SIGNIFICANT CHANGE UP (ref 3.3–5)
ALP SERPL-CCNC: 66 U/L — SIGNIFICANT CHANGE UP (ref 40–120)
ALP SERPL-CCNC: 75 U/L — SIGNIFICANT CHANGE UP (ref 40–120)
ALT FLD-CCNC: 25 U/L — SIGNIFICANT CHANGE UP (ref 4–41)
ALT FLD-CCNC: 26 U/L — SIGNIFICANT CHANGE UP (ref 4–41)
ANION GAP SERPL CALC-SCNC: 14 MMOL/L — SIGNIFICANT CHANGE UP (ref 7–14)
ANION GAP SERPL CALC-SCNC: 18 MMOL/L — HIGH (ref 7–14)
ANION GAP SERPL CALC-SCNC: 18 MMOL/L — HIGH (ref 7–14)
APPEARANCE UR: CLEAR — SIGNIFICANT CHANGE UP
AST SERPL-CCNC: 20 U/L — SIGNIFICANT CHANGE UP (ref 4–40)
AST SERPL-CCNC: 32 U/L — SIGNIFICANT CHANGE UP (ref 4–40)
B-OH-BUTYR SERPL-SCNC: 0.6 MMOL/L — HIGH (ref 0–0.4)
BASOPHILS # BLD AUTO: 0 K/UL — SIGNIFICANT CHANGE UP (ref 0–0.2)
BASOPHILS # BLD AUTO: 0.03 K/UL — SIGNIFICANT CHANGE UP (ref 0–0.2)
BASOPHILS NFR BLD AUTO: 0 % — SIGNIFICANT CHANGE UP (ref 0–2)
BASOPHILS NFR BLD AUTO: 0.2 % — SIGNIFICANT CHANGE UP (ref 0–2)
BILIRUB SERPL-MCNC: 0.4 MG/DL — SIGNIFICANT CHANGE UP (ref 0.2–1.2)
BILIRUB SERPL-MCNC: 0.5 MG/DL — SIGNIFICANT CHANGE UP (ref 0.2–1.2)
BILIRUB UR-MCNC: NEGATIVE — SIGNIFICANT CHANGE UP
BLOOD GAS VENOUS COMPREHENSIVE RESULT: SIGNIFICANT CHANGE UP
BUN SERPL-MCNC: 20 MG/DL — SIGNIFICANT CHANGE UP (ref 7–23)
BUN SERPL-MCNC: 23 MG/DL — SIGNIFICANT CHANGE UP (ref 7–23)
BUN SERPL-MCNC: 24 MG/DL — HIGH (ref 7–23)
CALCIUM SERPL-MCNC: 8.6 MG/DL — SIGNIFICANT CHANGE UP (ref 8.4–10.5)
CALCIUM SERPL-MCNC: 8.8 MG/DL — SIGNIFICANT CHANGE UP (ref 8.4–10.5)
CALCIUM SERPL-MCNC: 9.4 MG/DL — SIGNIFICANT CHANGE UP (ref 8.4–10.5)
CHLORIDE SERPL-SCNC: 100 MMOL/L — SIGNIFICANT CHANGE UP (ref 98–107)
CHLORIDE SERPL-SCNC: 101 MMOL/L — SIGNIFICANT CHANGE UP (ref 98–107)
CHLORIDE SERPL-SCNC: 102 MMOL/L — SIGNIFICANT CHANGE UP (ref 98–107)
CO2 SERPL-SCNC: 16 MMOL/L — LOW (ref 22–31)
CO2 SERPL-SCNC: 20 MMOL/L — LOW (ref 22–31)
CO2 SERPL-SCNC: 23 MMOL/L — SIGNIFICANT CHANGE UP (ref 22–31)
COLOR SPEC: YELLOW — SIGNIFICANT CHANGE UP
CREAT SERPL-MCNC: 1.08 MG/DL — SIGNIFICANT CHANGE UP (ref 0.5–1.3)
CREAT SERPL-MCNC: 1.19 MG/DL — SIGNIFICANT CHANGE UP (ref 0.5–1.3)
CREAT SERPL-MCNC: 1.2 MG/DL — SIGNIFICANT CHANGE UP (ref 0.5–1.3)
DIFF PNL FLD: NEGATIVE — SIGNIFICANT CHANGE UP
EGFR: 65 ML/MIN/1.73M2 — SIGNIFICANT CHANGE UP
EGFR: 66 ML/MIN/1.73M2 — SIGNIFICANT CHANGE UP
EGFR: 74 ML/MIN/1.73M2 — SIGNIFICANT CHANGE UP
EOSINOPHIL # BLD AUTO: 0 K/UL — SIGNIFICANT CHANGE UP (ref 0–0.5)
EOSINOPHIL # BLD AUTO: 0.08 K/UL — SIGNIFICANT CHANGE UP (ref 0–0.5)
EOSINOPHIL NFR BLD AUTO: 0 % — SIGNIFICANT CHANGE UP (ref 0–6)
EOSINOPHIL NFR BLD AUTO: 0.6 % — SIGNIFICANT CHANGE UP (ref 0–6)
GAS PNL BLDV: SIGNIFICANT CHANGE UP
GLUCOSE SERPL-MCNC: 193 MG/DL — HIGH (ref 70–99)
GLUCOSE SERPL-MCNC: 239 MG/DL — HIGH (ref 70–99)
GLUCOSE SERPL-MCNC: 280 MG/DL — HIGH (ref 70–99)
GLUCOSE UR QL: NEGATIVE MG/DL — SIGNIFICANT CHANGE UP
HCT VFR BLD CALC: 43.5 % — SIGNIFICANT CHANGE UP (ref 39–50)
HCT VFR BLD CALC: 46 % — SIGNIFICANT CHANGE UP (ref 39–50)
HGB BLD-MCNC: 14.4 G/DL — SIGNIFICANT CHANGE UP (ref 13–17)
HGB BLD-MCNC: 14.9 G/DL — SIGNIFICANT CHANGE UP (ref 13–17)
IANC: 10.88 K/UL — HIGH (ref 1.8–7.4)
IANC: 8.57 K/UL — HIGH (ref 1.8–7.4)
IMM GRANULOCYTES NFR BLD AUTO: 0.4 % — SIGNIFICANT CHANGE UP (ref 0–0.9)
KETONES UR-MCNC: ABNORMAL MG/DL
LEUKOCYTE ESTERASE UR-ACNC: NEGATIVE — SIGNIFICANT CHANGE UP
LIDOCAIN IGE QN: 24 U/L — SIGNIFICANT CHANGE UP (ref 7–60)
LYMPHOCYTES # BLD AUTO: 0.09 K/UL — LOW (ref 1–3.3)
LYMPHOCYTES # BLD AUTO: 0.9 % — LOW (ref 13–44)
LYMPHOCYTES # BLD AUTO: 1.01 K/UL — SIGNIFICANT CHANGE UP (ref 1–3.3)
LYMPHOCYTES # BLD AUTO: 7.8 % — LOW (ref 13–44)
MCHC RBC-ENTMCNC: 26.4 PG — LOW (ref 27–34)
MCHC RBC-ENTMCNC: 26.8 PG — LOW (ref 27–34)
MCHC RBC-ENTMCNC: 32.4 G/DL — SIGNIFICANT CHANGE UP (ref 32–36)
MCHC RBC-ENTMCNC: 33.1 G/DL — SIGNIFICANT CHANGE UP (ref 32–36)
MCV RBC AUTO: 80.9 FL — SIGNIFICANT CHANGE UP (ref 80–100)
MCV RBC AUTO: 81.6 FL — SIGNIFICANT CHANGE UP (ref 80–100)
MONOCYTES # BLD AUTO: 0.51 K/UL — SIGNIFICANT CHANGE UP (ref 0–0.9)
MONOCYTES # BLD AUTO: 0.91 K/UL — HIGH (ref 0–0.9)
MONOCYTES NFR BLD AUTO: 5.2 % — SIGNIFICANT CHANGE UP (ref 2–14)
MONOCYTES NFR BLD AUTO: 7 % — SIGNIFICANT CHANGE UP (ref 2–14)
NEUTROPHILS # BLD AUTO: 10.88 K/UL — HIGH (ref 1.8–7.4)
NEUTROPHILS # BLD AUTO: 8.92 K/UL — HIGH (ref 1.8–7.4)
NEUTROPHILS NFR BLD AUTO: 63.5 % — SIGNIFICANT CHANGE UP (ref 43–77)
NEUTROPHILS NFR BLD AUTO: 84 % — HIGH (ref 43–77)
NITRITE UR-MCNC: NEGATIVE — SIGNIFICANT CHANGE UP
NRBC # BLD: 0 /100 WBCS — SIGNIFICANT CHANGE UP (ref 0–0)
NRBC # FLD: 0 K/UL — SIGNIFICANT CHANGE UP (ref 0–0)
PH UR: 5.5 — SIGNIFICANT CHANGE UP (ref 5–8)
PLATELET # BLD AUTO: 167 K/UL — SIGNIFICANT CHANGE UP (ref 150–400)
PLATELET # BLD AUTO: 173 K/UL — SIGNIFICANT CHANGE UP (ref 150–400)
POTASSIUM SERPL-MCNC: 4 MMOL/L — SIGNIFICANT CHANGE UP (ref 3.5–5.3)
POTASSIUM SERPL-MCNC: 4.3 MMOL/L — SIGNIFICANT CHANGE UP (ref 3.5–5.3)
POTASSIUM SERPL-MCNC: 4.7 MMOL/L — SIGNIFICANT CHANGE UP (ref 3.5–5.3)
POTASSIUM SERPL-SCNC: 4 MMOL/L — SIGNIFICANT CHANGE UP (ref 3.5–5.3)
POTASSIUM SERPL-SCNC: 4.3 MMOL/L — SIGNIFICANT CHANGE UP (ref 3.5–5.3)
POTASSIUM SERPL-SCNC: 4.7 MMOL/L — SIGNIFICANT CHANGE UP (ref 3.5–5.3)
PROT SERPL-MCNC: 7.2 G/DL — SIGNIFICANT CHANGE UP (ref 6–8.3)
PROT SERPL-MCNC: 7.9 G/DL — SIGNIFICANT CHANGE UP (ref 6–8.3)
PROT UR-MCNC: NEGATIVE MG/DL — SIGNIFICANT CHANGE UP
RBC # BLD: 5.38 M/UL — SIGNIFICANT CHANGE UP (ref 4.2–5.8)
RBC # BLD: 5.64 M/UL — SIGNIFICANT CHANGE UP (ref 4.2–5.8)
RBC # FLD: 13.8 % — SIGNIFICANT CHANGE UP (ref 10.3–14.5)
RBC # FLD: 14 % — SIGNIFICANT CHANGE UP (ref 10.3–14.5)
SODIUM SERPL-SCNC: 136 MMOL/L — SIGNIFICANT CHANGE UP (ref 135–145)
SODIUM SERPL-SCNC: 138 MMOL/L — SIGNIFICANT CHANGE UP (ref 135–145)
SODIUM SERPL-SCNC: 138 MMOL/L — SIGNIFICANT CHANGE UP (ref 135–145)
SP GR SPEC: 1.06 — HIGH (ref 1–1.03)
TROPONIN T, HIGH SENSITIVITY RESULT: 10 NG/L — SIGNIFICANT CHANGE UP
UROBILINOGEN FLD QL: 0.2 MG/DL — SIGNIFICANT CHANGE UP (ref 0.2–1)
WBC # BLD: 12.96 K/UL — HIGH (ref 3.8–10.5)
WBC # BLD: 9.86 K/UL — SIGNIFICANT CHANGE UP (ref 3.8–10.5)
WBC # FLD AUTO: 12.96 K/UL — HIGH (ref 3.8–10.5)
WBC # FLD AUTO: 9.86 K/UL — SIGNIFICANT CHANGE UP (ref 3.8–10.5)

## 2024-11-04 PROCEDURE — 99285 EMERGENCY DEPT VISIT HI MDM: CPT

## 2024-11-04 PROCEDURE — 74177 CT ABD & PELVIS W/CONTRAST: CPT | Mod: 26,MC,77

## 2024-11-04 PROCEDURE — 93010 ELECTROCARDIOGRAM REPORT: CPT | Mod: 76

## 2024-11-04 PROCEDURE — 74177 CT ABD & PELVIS W/CONTRAST: CPT | Mod: 26,MC

## 2024-11-04 PROCEDURE — 71046 X-RAY EXAM CHEST 2 VIEWS: CPT | Mod: 26

## 2024-11-04 RX ORDER — ACETAMINOPHEN 500 MG
1000 TABLET ORAL ONCE
Refills: 0 | Status: COMPLETED | OUTPATIENT
Start: 2024-11-04 | End: 2024-11-04

## 2024-11-04 RX ORDER — METHOCARBAMOL 500 MG/1
1500 TABLET ORAL ONCE
Refills: 0 | Status: COMPLETED | OUTPATIENT
Start: 2024-11-04 | End: 2024-11-04

## 2024-11-04 RX ORDER — MAGNESIUM, ALUMINUM HYDROXIDE 200-200 MG
30 TABLET,CHEWABLE ORAL ONCE
Refills: 0 | Status: COMPLETED | OUTPATIENT
Start: 2024-11-04 | End: 2024-11-04

## 2024-11-04 RX ORDER — METHOCARBAMOL 500 MG/1
2 TABLET ORAL
Qty: 42 | Refills: 0
Start: 2024-11-04 | End: 2024-11-10

## 2024-11-04 RX ORDER — ONDANSETRON HYDROCHLORIDE 2 MG/ML
4 INJECTION, SOLUTION INTRAMUSCULAR; INTRAVENOUS ONCE
Refills: 0 | Status: COMPLETED | OUTPATIENT
Start: 2024-11-04 | End: 2024-11-04

## 2024-11-04 RX ORDER — PIPERACILLIN AND TAZOBACTAM .5; 4 G/20ML; G/20ML
3.38 INJECTION, POWDER, LYOPHILIZED, FOR SOLUTION INTRAVENOUS ONCE
Refills: 0 | Status: COMPLETED | OUTPATIENT
Start: 2024-11-04 | End: 2024-11-04

## 2024-11-04 RX ORDER — PANTOPRAZOLE SODIUM 40 MG/1
80 TABLET, DELAYED RELEASE ORAL ONCE
Refills: 0 | Status: COMPLETED | OUTPATIENT
Start: 2024-11-04 | End: 2024-11-04

## 2024-11-04 RX ORDER — FAMOTIDINE 10 MG/ML
20 INJECTION INTRAVENOUS ONCE
Refills: 0 | Status: COMPLETED | OUTPATIENT
Start: 2024-11-04 | End: 2024-11-04

## 2024-11-04 RX ORDER — ONDANSETRON HYDROCHLORIDE 2 MG/ML
1 INJECTION, SOLUTION INTRAMUSCULAR; INTRAVENOUS
Qty: 1 | Refills: 0
Start: 2024-11-04

## 2024-11-04 RX ORDER — SODIUM CHLORIDE 9 MG/ML
1000 INJECTION, SOLUTION INTRAMUSCULAR; INTRAVENOUS; SUBCUTANEOUS ONCE
Refills: 0 | Status: COMPLETED | OUTPATIENT
Start: 2024-11-04 | End: 2024-11-04

## 2024-11-04 RX ORDER — FAMOTIDINE 10 MG/ML
20 INJECTION INTRAVENOUS ONCE
Refills: 0 | Status: DISCONTINUED | OUTPATIENT
Start: 2024-11-04 | End: 2024-11-04

## 2024-11-04 RX ADMIN — PIPERACILLIN AND TAZOBACTAM 3.38 GRAM(S): .5; 4 INJECTION, POWDER, LYOPHILIZED, FOR SOLUTION INTRAVENOUS at 21:30

## 2024-11-04 RX ADMIN — ONDANSETRON HYDROCHLORIDE 4 MILLIGRAM(S): 2 INJECTION, SOLUTION INTRAMUSCULAR; INTRAVENOUS at 18:25

## 2024-11-04 RX ADMIN — Medication 400 MILLIGRAM(S): at 05:35

## 2024-11-04 RX ADMIN — SODIUM CHLORIDE 1000 MILLILITER(S): 9 INJECTION, SOLUTION INTRAMUSCULAR; INTRAVENOUS; SUBCUTANEOUS at 05:39

## 2024-11-04 RX ADMIN — Medication 100 MILLILITER(S): at 23:27

## 2024-11-04 RX ADMIN — SODIUM CHLORIDE 1000 MILLILITER(S): 9 INJECTION, SOLUTION INTRAMUSCULAR; INTRAVENOUS; SUBCUTANEOUS at 18:25

## 2024-11-04 RX ADMIN — PIPERACILLIN AND TAZOBACTAM 200 GRAM(S): .5; 4 INJECTION, POWDER, LYOPHILIZED, FOR SOLUTION INTRAVENOUS at 20:36

## 2024-11-04 RX ADMIN — PANTOPRAZOLE SODIUM 80 MILLIGRAM(S): 40 TABLET, DELAYED RELEASE ORAL at 23:01

## 2024-11-04 RX ADMIN — Medication 30 MILLILITER(S): at 05:35

## 2024-11-04 RX ADMIN — ONDANSETRON HYDROCHLORIDE 4 MILLIGRAM(S): 2 INJECTION, SOLUTION INTRAMUSCULAR; INTRAVENOUS at 05:39

## 2024-11-04 RX ADMIN — FAMOTIDINE 20 MILLIGRAM(S): 10 INJECTION INTRAVENOUS at 05:39

## 2024-11-04 RX ADMIN — METHOCARBAMOL 1500 MILLIGRAM(S): 500 TABLET ORAL at 07:42

## 2024-11-04 NOTE — ED ADULT NURSE REASSESSMENT NOTE - NS ED NURSE REASSESS COMMENT FT1
Received patient from previous RN, A&O X 4, documentation as noted. Patient denies any pain or medical complaints at this time. Patient able to speak in clear sentences, respirations equal and unlabored. Patient pending re-eval. Patient in no acute distress at this time, will continue to monitor. VSS as noted in flowsheet.

## 2024-11-04 NOTE — ED PROVIDER NOTE - ENMT, MLM
Airway patent, Nasal mucosa clear. Throat has no vesicles, no oropharyngeal exudates and uvula is midline. Mucosa dry.

## 2024-11-04 NOTE — ED PROVIDER NOTE - PROGRESS NOTE DETAILS
PGY-2/DO Buster: Patient signed out to me at shift change. 69 y M PMH DM, HTN cc 9x nb/nb vomiting, diarrhea with nonradiating epigastric pain x1 day. CTAP showing fluid in colon c/w diarrhea. Given 1L fluid and symptomatic control. Lactate 2.3 and mild leukocytosis 13k likely reactive from vomiting. Will repeat lactate, PO challenge and plan for d/c. Attending MD Hamilton.  Pt signed out to me in stable condition pending BMP-r > TBDC, 69 with pmhx HTN, HLD, gastroenteritis, likely viral, n/v, CT non-actionable, labs c/w dehydration, planned rep labs, tolerated PO prior to signout. Cheryle Lucas MD PGY-3: lactate cleared; patient tolerating breakfast tray. will dc with close outpatient follow-up.

## 2024-11-04 NOTE — CONSULT NOTE ADULT - ASSESSMENT
68 yo M w/ PMHx of DM, HTN, HLD, CAD no significant PSHx who prsents to the ED for the second time today w/ persistent N/V and watery diarrhea x1 day. Patient was discharged home w/ acute radiographic findings however re-presents after persistent vomiting and watery diarrhea. CT A/P earlier today showing fluid in the ascending colon. Repeat CT A/P w/ increased gastric emphesema and adjacent mesenteric venous gas and portal venous   gas. Surgery consulted for evaluation.     Recommendations:   - No acute surgical intervention at this time     - Patient denies abdominal pain, abdominal exam benign     - Suspect symptoms and radiographic findings 2/2 dehydration and persistent emesis   - Recommend medicine admission for management of gastroenteritis and dehydration      - Continue IVF resuscitation      - Obtain repeat lactate in AM   - Will perform serial abdominal exams       Discussed w/ attending     B Team Surgery   [id number]

## 2024-11-04 NOTE — ED PROVIDER NOTE - OBJECTIVE STATEMENT
69-year-old male with past medical history of diabetes on metformin, hypertension, hyperlipidemia presents to ED as second visit for nausea vomiting diarrhea today. Pt cannot tolerate PO.  Patient was discharged home.  States he developed 10 new episodes of vomiting despite taking Zofran sublingual.  Had 20+ watery episodes of diarrhea since 3 AM this morning.  States had spaghetti meatballs last night.  Denies any fast food sick contacts recent travel recent antibiotics history of C. difficile.  No fevers chills abdominal pain chest pain cough congestion dysuria.

## 2024-11-04 NOTE — CONSULT NOTE ADULT - SUBJECTIVE AND OBJECTIVE BOX
SURGERY CONSULT NOTE    HPI:  70 yo M w/ PMHx of DM, HTN, HLD, CAD no significant PSHx who prsents to the ED for the second time today w/ persistent N/V and watery diarrhea x1 day (had spaghetti meaballs night prior w/ wife who was also feeling unwell). Patient was discharged home w/ acute radiographic findings however re-presents after persistent vomiting and watery diarrhea.     In the ED, AVSS. Labs show no leukocytosis w/ bandemia. Lactate 2.3 on initial presentation, cleared to 1.7. Now lactate 2.8, repeat after 1L bolus 3.3.     At time of evaluation, patient states abdominal pain resolved.     CT A/P earlier today showing fluid in the ascending colon. Repeat CT A/P w/ increased gastric emphesema and adjacent mesenteric venous gas and portal venous   gas. Surgery consulted for evaluation.       PAST MEDICAL HISTORY:  Hypertension    Diabetes    CAD      PAST SURGICAL HISTORY:  No significant past surgical history        MEDICATIONS:  lactated ringers. 1000 milliLiter(s) IV Continuous <Continuous>      ALLERGIES:  No Known Allergies      VITALS & I/Os:  Vital Signs Last 24 Hrs  T(C): 37 (04 Nov 2024 23:50), Max: 37.1 (04 Nov 2024 08:40)  T(F): 98.6 (04 Nov 2024 23:50), Max: 98.7 (04 Nov 2024 08:40)  HR: 88 (04 Nov 2024 23:50) (79 - 97)  BP: 119/68 (04 Nov 2024 23:50) (119/68 - 157/96)  RR: 18 (04 Nov 2024 23:50) (16 - 20)  SpO2: 97% (04 Nov 2024 23:50) (97% - 100%)    Parameters below as of 04 Nov 2024 23:50  Patient On (Oxygen Delivery Method): room air        PHYSICAL EXAM:  General: No acute distress  Respiratory: Nonlabored  Cardiovascular: RRR  Abdominal: Soft, nondistended, nontender. No rebound or guarding. No organomegaly, no palpable mass.  Extremities: Warm      LABS:                        14.4   9.86  )-----------( 167      ( 04 Nov 2024 18:30 )             43.5     11-04    138  |  101  |  20  ----------------------------<  280[H]  4.7   |  23  |  1.19    Ca    8.8      04 Nov 2024 18:30    TPro  7.2  /  Alb  4.2  /  TBili  0.5  /  DBili  x   /  AST  20  /  ALT  25  /  AlkPhos  66  11-04    Lactate:  11-04 @ 22:17  3.1  11-04 @ 18:30  2.8  11-04 @ 07:02  1.7  11-04 @ 05:27  2.3        Urinalysis Basic - ( 04 Nov 2024 18:30 )    Color: x / Appearance: x / SG: x / pH: x  Gluc: 280 mg/dL / Ketone: x  / Bili: x / Urobili: x   Blood: x / Protein: x / Nitrite: x   Leuk Esterase: x / RBC: x / WBC x   Sq Epi: x / Non Sq Epi: x / Bacteria: x

## 2024-11-04 NOTE — ED ADULT TRIAGE NOTE - CHIEF COMPLAINT QUOTE
Vomiting and diarrhea since two hours ago. Denies any chest or abdominal pain or fever. past history of  HTN, High cholesterol, Type 2 DM,  by EMS.

## 2024-11-04 NOTE — ED PROVIDER NOTE - PHYSICAL EXAMINATION
Gail Olivier MD (PGY-1)  Physical Exam:  Gen: NAD, AOx3  Head: NCAT  HEENT: EOMI, PEERLA  Lung: CTAB, no respiratory distress, no wheezes/rhonchi/rales B/L  CV: RRR, no murmurs, rubs or gallops  Abd: Mildly tender abdomen throughout. Soft, ND, no guarding, no rigidity, no rebound tenderness, no CVA tenderness   MSK: no visible deformities, ROM normal in UE/LE, no back pain  Neuro: No focal sensory or motor deficits. Sensation intact to light touch all extremities.  Psych: normal affect, calm

## 2024-11-04 NOTE — ED ADULT NURSE NOTE - OBJECTIVE STATEMENT
Patient received in room 14. Patient AOx4, ambulatory at baseline, able to speak in full sentences, c/o N/V and diarrhea. Patient has a past medical history of HTN and DM. Patient c/o N/V and diarrhea that started about two hour ago, associated with epigastric "burning" pain. Patient states last meal he had was spaghetti and meatballs, Patient denies chest pain, SOB, fever or chills. Denies blood in stool or emesis. 20G placed in LAC; labs drawn and sent. Will medicated patient per chart. Patient pending CT scan. Patient placed on cardiac monitor; NSR. Patient appears comfortable on stretcher and in no signs of acute distress. Abdomen soft and nondistended, Respirations even and unlabored, chest rise symmetrical b/l. Comfort measures maintained. Bed in lowest position. Safety maintained,

## 2024-11-04 NOTE — ED PROVIDER NOTE - OBJECTIVE STATEMENT
Patient is a 69 y.o M presenting with complaints of vomiting and diarrhea x 1 day. Patient states he has had up to 9 episodes of vomiting he recalls, denies any blood in the emesis. Endorses chest pain, primarily in the epigastric region described as non-radiating. Patient has history of diabetes, HTN, HLD. Patient denies fever, chills, recent fever. Patient denies lower extremity edema, pain radiating to the back, numbness, weakness. Denies any pain or difficulty when swallowing. Denies smoking. Endorses social alcohol use.

## 2024-11-04 NOTE — ED PROVIDER NOTE - PATIENT PORTAL LINK FT
You can access the FollowMyHealth Patient Portal offered by [facility] by registering at the following website: [email or web address]. By joining [employer]’s FollowMyHealth portal, you will also be able to view your health information using other applications (apps) compatible with our system.

## 2024-11-04 NOTE — ED ADULT NURSE NOTE - OBJECTIVE STATEMENT
pt received to FT. A&O x4, ambulatory, hx of DM, HTN, and stent placed in May 2024. coming to the ED c/o diffuse intermittent abdominal pain, nausea, vomiting and diarrhea since last night. Pt was seen in main ED yesterday for same complaint. Pt denies pain abdominal pain at this time. Endorsing nausea, with last episode of vomiting and diarrhea " a few hours ago". Denies fever, chills, chest pain, SOB, headache, dizziness, or blurry vision. RR even and unlabored. Abdomen soft, non-tender and non-distended. Skin clean dry and intact. 20 G IV placed to right AC. labs drawn and sent medicated as ordered. care plan continued. comfort measures provided. safety maintained. awaiting lab results.

## 2024-11-04 NOTE — ED PROVIDER NOTE - NSFOLLOWUPINSTRUCTIONS_ED_ALL_ED_FT
You were seen in the emergency room today for nausea, vomiting, diarrhea.    See the attached information for your lab and CT results.     your prescription for Zofran and take as directed.    Follow-up with your primary care doctor as discussed.    Return to the emergency room for any new or worsening symptoms.

## 2024-11-04 NOTE — ED PROVIDER NOTE - ATTENDING CONTRIBUTION TO CARE
I have personally performed a face to face medical and diagnostic evaluation of the patient. I have discussed with and reviewed the Resident's note and agree with the History, ROS, Physical Exam and MDM unless otherwise indicated. A brief summary of my personal evaluation and impression can be found below.    69-year-old male, past medical history of hypertension hyperlipidemia, diabetes on Mounjaro and metformin presenting with chief complaint of nausea, vomiting, diarrhea since 2 AM.  No travel outside of the country, denies melena or bloody stools.  Episodes of abdominal pain during diarrheal episodes.  On exam, vital signs stable, mild reproducible lower abdominal tenderness to palpation.  Rest of exam is nonfocal.  Symptoms likely viral gastroenteritis, will obtain labs including abdominal enzymes and electrolytes.  Will obtain CT of the abdomen pelvis to assess for alternate etiology such as diverticulitis.  Plan for symptomatic relief, reassess to dispo. Kamilah Abernathy, ED Attending

## 2024-11-04 NOTE — ED PROVIDER NOTE - CARE PLAN
1 Principal Discharge DX:	Acute gastroenteritis   Principal Discharge DX:	Acute gastroenteritis  Secondary Diagnosis:	CAD (coronary artery disease)  Secondary Diagnosis:	Emphysematous gastritis

## 2024-11-04 NOTE — ED PROVIDER NOTE - NS ED ROS FT
GENERAL: No fever or chills  EYES: No change in vision  HEENT: No trouble swallowing or speaking  CARDIAC: No chest pain  PULMONARY: No cough or SOB  GI: + abdominal pain, +nausea + vomiting + diarrhea  : No changes in urination  SKIN: No rashes  NEURO: No headache, no numbness  MSK: No joint pain  Otherwise as HPI or negative.

## 2024-11-04 NOTE — ED PROVIDER NOTE - PROGRESS NOTE DETAILS
DEMOND Yeh- pt with bandemia of 27. plan to check blood cultures, CT, provide IV antibiotics. DD ED ATTG: surg consulted for poss gastric perf ?perf'ed gastric ulcer. DD ED ATTG: surg consulted for poss gastric perf ?perf'ed gastric ulcer.  pt still feels OK, no abd pain. DEMOND Cruz: received pt in sign out pending CT results  CT showing gastric emphysema with mesenteric gas, IV PPI ordered, surgery consulted, recommending medical admission, discussed with hospitalist for admission

## 2024-11-04 NOTE — ED PROVIDER NOTE - ATTENDING CONTRIBUTION TO CARE
69M h/o DM metformin, HTN, p/w inability to esperanza PO, came in this morning had N/V/D.  Pt had spaghetti yest.  Pt had labs, urine, CT this morning.  Thought to have AGE.  Pt was d/c this morning, 10x more episodes of vomiting.  Plan check labs, rx zofran.  Check c-diff although no risk factors.  Likely admit.    VS:  unremarkable    GEN - mild discomfort abd pain, malaise   A+O x3   HEAD - NC/AT     ENT - PEERL, EOMI, mucous membranes    dry, no discharge      NECK: Neck supple, non-tender without lymphadenopathy, no masses, no JVD  PULM - CTA b/l,  symmetric breath sounds  COR -  normal heart sounds    ABD - , ND, NT, soft,  BACK - no CVA tenderness, nontender spine     EXTREMS - no edema, no deformity, warm and well perfused    SKIN - no rash    or bruising      NEUROLOGIC - alert, face symmetric, speech fluent, sensation nl, motor no focal deficit.

## 2024-11-04 NOTE — CONSULT NOTE ADULT - ATTENDING COMMENTS
I have reviewed the history, pertinent labs and imaging, and discussed the care with the consult resident.  More than 50% of this 55 minute encounter including face to face with the patient was spent counseling and/or coordination of care on gastric pneumatosis.  Time included review of vitals, labs, imaging, discussion with consultants and coordination with the operating room/emergency department.    70yo M presenting with Increased diarrhea and abdominal pain. Pain resolved after IV hydration. Repeat CT with gastric pneumatosis and few droplets of portal venous gas, new from prior CT this AM. Likely due to dehydration and vomiting/diarrhea from gastroenteritis. Pt now asymptomatic and nontoxic appearing. Continue supportive care, will likely resolve with hydration.     - Serial abdominal exams  - IV hydration   - trend lactate   - Will follow     The active issues are:  1. gastroenteritis    The Acute Care Surgery (B Team) Attending Group Practice:  Dr. Albania Lopez    urgent issues - spectra [id number]  nonurgent issues - [phone]  patient appointments or afterhours - [phone]

## 2024-11-04 NOTE — ED ADULT NURSE NOTE - NSFALLHARMRISKINTERV_ED_ALL_ED

## 2024-11-04 NOTE — ED PROVIDER NOTE - CLINICAL SUMMARY MEDICAL DECISION MAKING FREE TEXT BOX
69-year-old male with past medical history of diabetes on metformin, hypertension, hyperlipidemia presents to ED as second visit for nausea vomiting diarrhea today. Pt cannot tolerate PO.  Patient was discharged home.  States he developed 10 new episodes of vomiting despite taking Zofran sublingual.  Had 20+ watery episodes of diarrhea since 3 AM this morning.  States had spaghetti meatballs last night. pt well appearing, mucosa dry. vitals stable. abd soft, nontender. CT with no acute findings on prior ED visit this morning. plan to check labs including electrolytes, hydrate, antiemetics, check stool cultures, PCR, likely CDU vs admission for gastroenteritis.

## 2024-11-04 NOTE — ED ADULT TRIAGE NOTE - CHIEF COMPLAINT QUOTE
Pt coming to ER c/o  abdominal pain with nausea and vomiting since last night. When asked where the pain is, patient states its all over.  Pt was discharged from here earlier this morning. Pt noted to be groaning in pain. Hx HTN, T2DM

## 2024-11-05 DIAGNOSIS — K52.9 NONINFECTIVE GASTROENTERITIS AND COLITIS, UNSPECIFIED: ICD-10-CM

## 2024-11-05 DIAGNOSIS — Z29.9 ENCOUNTER FOR PROPHYLACTIC MEASURES, UNSPECIFIED: ICD-10-CM

## 2024-11-05 DIAGNOSIS — Z79.899 OTHER LONG TERM (CURRENT) DRUG THERAPY: ICD-10-CM

## 2024-11-05 DIAGNOSIS — D72.825 BANDEMIA: ICD-10-CM

## 2024-11-05 DIAGNOSIS — I25.10 ATHEROSCLEROTIC HEART DISEASE OF NATIVE CORONARY ARTERY WITHOUT ANGINA PECTORIS: ICD-10-CM

## 2024-11-05 DIAGNOSIS — E11.9 TYPE 2 DIABETES MELLITUS WITHOUT COMPLICATIONS: ICD-10-CM

## 2024-11-05 DIAGNOSIS — Z98.890 OTHER SPECIFIED POSTPROCEDURAL STATES: Chronic | ICD-10-CM

## 2024-11-05 LAB
ALBUMIN SERPL ELPH-MCNC: 3.5 G/DL — SIGNIFICANT CHANGE UP (ref 3.3–5)
ALP SERPL-CCNC: 50 U/L — SIGNIFICANT CHANGE UP (ref 40–120)
ALT FLD-CCNC: 19 U/L — SIGNIFICANT CHANGE UP (ref 4–41)
ANION GAP SERPL CALC-SCNC: 14 MMOL/L — SIGNIFICANT CHANGE UP (ref 7–14)
APTT BLD: 21.4 SEC — LOW (ref 24.5–35.6)
AST SERPL-CCNC: 18 U/L — SIGNIFICANT CHANGE UP (ref 4–40)
BASOPHILS # BLD AUTO: 0.01 K/UL — SIGNIFICANT CHANGE UP (ref 0–0.2)
BASOPHILS NFR BLD AUTO: 0.1 % — SIGNIFICANT CHANGE UP (ref 0–2)
BILIRUB SERPL-MCNC: 0.3 MG/DL — SIGNIFICANT CHANGE UP (ref 0.2–1.2)
BLD GP AB SCN SERPL QL: NEGATIVE — SIGNIFICANT CHANGE UP
BUN SERPL-MCNC: 19 MG/DL — SIGNIFICANT CHANGE UP (ref 7–23)
CALCIUM SERPL-MCNC: 7.9 MG/DL — LOW (ref 8.4–10.5)
CHLORIDE SERPL-SCNC: 104 MMOL/L — SIGNIFICANT CHANGE UP (ref 98–107)
CO2 SERPL-SCNC: 19 MMOL/L — LOW (ref 22–31)
CREAT SERPL-MCNC: 1.19 MG/DL — SIGNIFICANT CHANGE UP (ref 0.5–1.3)
CULTURE RESULTS: SIGNIFICANT CHANGE UP
EGFR: 66 ML/MIN/1.73M2 — SIGNIFICANT CHANGE UP
EOSINOPHIL # BLD AUTO: 0.01 K/UL — SIGNIFICANT CHANGE UP (ref 0–0.5)
EOSINOPHIL NFR BLD AUTO: 0.1 % — SIGNIFICANT CHANGE UP (ref 0–6)
GLUCOSE BLDC GLUCOMTR-MCNC: 139 MG/DL — HIGH (ref 70–99)
GLUCOSE BLDC GLUCOMTR-MCNC: 147 MG/DL — HIGH (ref 70–99)
GLUCOSE BLDC GLUCOMTR-MCNC: 150 MG/DL — HIGH (ref 70–99)
GLUCOSE BLDC GLUCOMTR-MCNC: 159 MG/DL — HIGH (ref 70–99)
GLUCOSE BLDC GLUCOMTR-MCNC: 175 MG/DL — HIGH (ref 70–99)
GLUCOSE SERPL-MCNC: 149 MG/DL — HIGH (ref 70–99)
HCT VFR BLD CALC: 36.5 % — LOW (ref 39–50)
HGB BLD-MCNC: 12.4 G/DL — LOW (ref 13–17)
IANC: 8.02 K/UL — HIGH (ref 1.8–7.4)
IMM GRANULOCYTES NFR BLD AUTO: 0.8 % — SIGNIFICANT CHANGE UP (ref 0–0.9)
INR BLD: 1.09 RATIO — SIGNIFICANT CHANGE UP (ref 0.85–1.16)
LACTATE SERPL-SCNC: 1.2 MMOL/L — SIGNIFICANT CHANGE UP (ref 0.5–2)
LYMPHOCYTES # BLD AUTO: 1.76 K/UL — SIGNIFICANT CHANGE UP (ref 1–3.3)
LYMPHOCYTES # BLD AUTO: 16.3 % — SIGNIFICANT CHANGE UP (ref 13–44)
MAGNESIUM SERPL-MCNC: 1.9 MG/DL — SIGNIFICANT CHANGE UP (ref 1.6–2.6)
MCHC RBC-ENTMCNC: 26.8 PG — LOW (ref 27–34)
MCHC RBC-ENTMCNC: 34 G/DL — SIGNIFICANT CHANGE UP (ref 32–36)
MCV RBC AUTO: 79 FL — LOW (ref 80–100)
MONOCYTES # BLD AUTO: 0.89 K/UL — SIGNIFICANT CHANGE UP (ref 0–0.9)
MONOCYTES NFR BLD AUTO: 8.3 % — SIGNIFICANT CHANGE UP (ref 2–14)
NEUTROPHILS # BLD AUTO: 8.02 K/UL — HIGH (ref 1.8–7.4)
NEUTROPHILS NFR BLD AUTO: 74.4 % — SIGNIFICANT CHANGE UP (ref 43–77)
NRBC # BLD: 0 /100 WBCS — SIGNIFICANT CHANGE UP (ref 0–0)
NRBC # FLD: 0 K/UL — SIGNIFICANT CHANGE UP (ref 0–0)
PHOSPHATE SERPL-MCNC: 3.5 MG/DL — SIGNIFICANT CHANGE UP (ref 2.5–4.5)
PLATELET # BLD AUTO: 146 K/UL — LOW (ref 150–400)
POTASSIUM SERPL-MCNC: 4 MMOL/L — SIGNIFICANT CHANGE UP (ref 3.5–5.3)
POTASSIUM SERPL-SCNC: 4 MMOL/L — SIGNIFICANT CHANGE UP (ref 3.5–5.3)
PROT SERPL-MCNC: 6.1 G/DL — SIGNIFICANT CHANGE UP (ref 6–8.3)
PROTHROM AB SERPL-ACNC: 12.6 SEC — SIGNIFICANT CHANGE UP (ref 9.9–13.4)
RBC # BLD: 4.62 M/UL — SIGNIFICANT CHANGE UP (ref 4.2–5.8)
RBC # FLD: 13.9 % — SIGNIFICANT CHANGE UP (ref 10.3–14.5)
RH IG SCN BLD-IMP: NEGATIVE — SIGNIFICANT CHANGE UP
SODIUM SERPL-SCNC: 137 MMOL/L — SIGNIFICANT CHANGE UP (ref 135–145)
SPECIMEN SOURCE: SIGNIFICANT CHANGE UP
WBC # BLD: 10.78 K/UL — HIGH (ref 3.8–10.5)
WBC # FLD AUTO: 10.78 K/UL — HIGH (ref 3.8–10.5)

## 2024-11-05 PROCEDURE — 99223 1ST HOSP IP/OBS HIGH 75: CPT

## 2024-11-05 RX ORDER — GLUCAGON INJECTION, SOLUTION 1 MG/.2ML
1 INJECTION, SOLUTION SUBCUTANEOUS ONCE
Refills: 0 | Status: DISCONTINUED | OUTPATIENT
Start: 2024-11-05 | End: 2024-11-06

## 2024-11-05 RX ORDER — LISINOPRIL 40 MG
10 TABLET ORAL DAILY
Refills: 0 | Status: DISCONTINUED | OUTPATIENT
Start: 2024-11-05 | End: 2024-11-05

## 2024-11-05 RX ORDER — MELATONIN 5 MG
3 TABLET ORAL AT BEDTIME
Refills: 0 | Status: DISCONTINUED | OUTPATIENT
Start: 2024-11-05 | End: 2024-11-06

## 2024-11-05 RX ORDER — CLOPIDOGREL 75 MG/1
75 TABLET ORAL DAILY
Refills: 0 | Status: DISCONTINUED | OUTPATIENT
Start: 2024-11-05 | End: 2024-11-06

## 2024-11-05 RX ORDER — MAGNESIUM, ALUMINUM HYDROXIDE 200-200 MG
30 TABLET,CHEWABLE ORAL EVERY 4 HOURS
Refills: 0 | Status: DISCONTINUED | OUTPATIENT
Start: 2024-11-05 | End: 2024-11-06

## 2024-11-05 RX ORDER — METOPROLOL TARTRATE 50 MG
1 TABLET ORAL
Refills: 0 | DISCHARGE

## 2024-11-05 RX ORDER — INSULIN LISPRO 100/ML
VIAL (ML) SUBCUTANEOUS AT BEDTIME
Refills: 0 | Status: DISCONTINUED | OUTPATIENT
Start: 2024-11-05 | End: 2024-11-05

## 2024-11-05 RX ORDER — INSULIN LISPRO 100/ML
VIAL (ML) SUBCUTANEOUS AT BEDTIME
Refills: 0 | Status: DISCONTINUED | OUTPATIENT
Start: 2024-11-05 | End: 2024-11-06

## 2024-11-05 RX ORDER — INSULIN LISPRO 100/ML
VIAL (ML) SUBCUTANEOUS
Refills: 0 | Status: DISCONTINUED | OUTPATIENT
Start: 2024-11-05 | End: 2024-11-06

## 2024-11-05 RX ORDER — ACETAMINOPHEN 500 MG
650 TABLET ORAL EVERY 6 HOURS
Refills: 0 | Status: DISCONTINUED | OUTPATIENT
Start: 2024-11-05 | End: 2024-11-06

## 2024-11-05 RX ORDER — LATANOPROST 0.005 %
1 DROPS OPHTHALMIC (EYE) AT BEDTIME
Refills: 0 | Status: DISCONTINUED | OUTPATIENT
Start: 2024-11-05 | End: 2024-11-06

## 2024-11-05 RX ORDER — LISINOPRIL 40 MG
10 TABLET ORAL DAILY
Refills: 0 | Status: DISCONTINUED | OUTPATIENT
Start: 2024-11-05 | End: 2024-11-06

## 2024-11-05 RX ORDER — INSULIN LISPRO 100/ML
VIAL (ML) SUBCUTANEOUS
Refills: 0 | Status: DISCONTINUED | OUTPATIENT
Start: 2024-11-05 | End: 2024-11-05

## 2024-11-05 RX ORDER — ONDANSETRON HYDROCHLORIDE 2 MG/ML
4 INJECTION, SOLUTION INTRAMUSCULAR; INTRAVENOUS EVERY 8 HOURS
Refills: 0 | Status: DISCONTINUED | OUTPATIENT
Start: 2024-11-05 | End: 2024-11-06

## 2024-11-05 RX ORDER — INFLUENZ VIR VAC TV P-SURF2003 15MCG/.5ML
0.5 SYRINGE (ML) INTRAMUSCULAR ONCE
Refills: 0 | Status: DISCONTINUED | OUTPATIENT
Start: 2024-11-05 | End: 2024-11-06

## 2024-11-05 RX ORDER — TIRZEPATIDE 5 MG/.5ML
10 INJECTION, SOLUTION SUBCUTANEOUS
Refills: 0 | DISCHARGE

## 2024-11-05 RX ORDER — INSULIN LISPRO 100/ML
VIAL (ML) SUBCUTANEOUS EVERY 6 HOURS
Refills: 0 | Status: DISCONTINUED | OUTPATIENT
Start: 2024-11-05 | End: 2024-11-05

## 2024-11-05 RX ORDER — METOPROLOL TARTRATE 50 MG
25 TABLET ORAL DAILY
Refills: 0 | Status: DISCONTINUED | OUTPATIENT
Start: 2024-11-05 | End: 2024-11-06

## 2024-11-05 RX ORDER — ASPIRIN/MAG CARB/ALUMINUM AMIN 325 MG
81 TABLET ORAL DAILY
Refills: 0 | Status: DISCONTINUED | OUTPATIENT
Start: 2024-11-05 | End: 2024-11-06

## 2024-11-05 RX ADMIN — Medication 80 MILLIGRAM(S): at 21:39

## 2024-11-05 RX ADMIN — Medication 1 DROP(S): at 23:39

## 2024-11-05 RX ADMIN — Medication 1: at 12:24

## 2024-11-05 RX ADMIN — Medication 1000 MILLILITER(S): at 03:21

## 2024-11-05 NOTE — H&P ADULT - NSHPLABSRESULTS_GEN_ALL_CORE
.  LABS:                         14.4   9.86  )-----------( 167      ( 04 Nov 2024 18:30 )             43.5     11-04    138  |  101  |  20  ----------------------------<  280[H]  4.7   |  23  |  1.19    Ca    8.8      04 Nov 2024 18:30    TPro  7.2  /  Alb  4.2  /  TBili  0.5  /  DBili  x   /  AST  20  /  ALT  25  /  AlkPhos  66  11-04    PT/INR - ( 04 Nov 2024 23:48 )   PT: 12.6 sec;   INR: 1.09 ratio       PTT - ( 04 Nov 2024 23:48 )  PTT:21.4 sec  Urinalysis Basic - ( 04 Nov 2024 18:30 )    Color: x / Appearance: x / SG: x / pH: x  Gluc: 280 mg/dL / Ketone: x  / Bili: x / Urobili: x   Blood: x / Protein: x / Nitrite: x   Leuk Esterase: x / RBC: x / WBC x   Sq Epi: x / Non Sq Epi: x / Bacteria: x    RADIOLOGY, EKG & ADDITIONAL TESTS: Reviewed.  - EKG with 1st degree AVB and nonspecific intraventricular block,

## 2024-11-05 NOTE — H&P ADULT - NSHPPHYSICALEXAM_GEN_ALL_CORE
VITALS:   Vital Signs Last 24 Hrs  T(C): 37 (04 Nov 2024 23:50), Max: 37.1 (04 Nov 2024 08:40)  T(F): 98.6 (04 Nov 2024 23:50), Max: 98.7 (04 Nov 2024 08:40)  HR: 88 (04 Nov 2024 23:50) (79 - 97)  BP: 119/68 (04 Nov 2024 23:50) (119/68 - 157/96)  BP(mean): --  RR: 18 (04 Nov 2024 23:50) (16 - 20)  SpO2: 97% (04 Nov 2024 23:50) (97% - 100%)    Parameters below as of 04 Nov 2024 23:50  Patient On (Oxygen Delivery Method): room air      I&O's Summary    CAPILLARY BLOOD GLUCOSE      POCT Blood Glucose.: 283 mg/dL (04 Nov 2024 15:08)    Physical Exam:  General: NAD, non-toxic appearing   HEENT: PERRL, EOMi, no scleral icterus  CV: RRR, normal S1 and S2  Lungs: normal respiratory effort on RA, CTAB  Abd: soft, nontender, nondistended  Ext: no edema, 2+ peripheral pulses   Pysch: AAOx3, appropriate affect   Neuro: grossly non-focal, moving all extremities spontaneously   Skin: no rashes or lesions

## 2024-11-05 NOTE — H&P ADULT - PROBLEM SELECTOR PLAN 4
pt does not know list of medications but states all providers within [facility]. Medrec completed referencing surescrilonnie and GIULIANA. Daughter to bring list of medications when she visits later today, f/u

## 2024-11-05 NOTE — H&P ADULT - PROBLEM SELECTOR PROBLEM 2
Infant remains on microflow NC 0.025 LPM @ 100%. Remains on caffeine as ordered. No episodes tonight. Tolerating Q 3 hr feeds of Donor BM fortified to 26 romaine . No emesis or residuals noted. Girth stable. Voiding and stooling well.  No change in weight overn CAD (coronary artery disease)

## 2024-11-05 NOTE — CHART NOTE - NSCHARTNOTEFT_GEN_A_CORE
PT seen and evaluated at bedside resting comfortably. Reports no bowel movements since arriving to the ED and reports improvement in symptoms. Abdomina exam consistent with previous exam- soft, nontender, nondistended without rebound or guarding. Will continue to monitor patient and will complete further serial abdominal exams.     B Team Surgery   [id number]

## 2024-11-05 NOTE — H&P ADULT - ASSESSMENT
69M with PMH CAD s/p RHONDA to RCA (4/8/24 on DAPT), HTN, HLD, and T2DM returning to hospital for multiple episodes of vomiting and diarrhea x 1 day. Admitted for management given inability to tolerate PO and radiographic findings of gastric emphysema in setting of mildly elevated lactate. Overall well appearing with benign abdominal exam.

## 2024-11-05 NOTE — H&P ADULT - HISTORY OF PRESENT ILLNESS
69M with PMH CAD s/p RHONDA to RCA (4/8/24 on DAPT), HTN, HLD, and T2DM returning to hospital for persistent vomiting and diarrhea that started evening of 11/3. Had presented to ED for it on 11/4 with negative CT A/P imaging, deemed likely secondary to gastroenteritis and pt was discharged home. However, returned several hours later due to persistent vomiting, diarrhea, intermittent abdominal pain, and inability to tolerate PO. Reports 10+ episodes of vomiting and 20+ episodes of watery diarrhea since symptom onset. Pt reports having spaghetti with meatballs at home with family prior to this. No one else sick at home. No fevers, hematochezia, hematemesis, recent travel, ill contacts, recent antibiotics, or prior history of C. diff.     In ED, afebrile, hemodynamically stable. Labs notable for 27% bands, elevated lactate 2.3 on admission that cleared to 1.7 on repeat without intervention, uptrended again to 2.8, received 1L IVF bolus, with repeat 3.1.   Initial CT A/P largely unremarkable aside from fluid in ascending colon likely corresponding to diarrhea. Repeat CT A/P  with increased gastric emphysema and adjacent mesenteric venous and portal venous gas, no free air. CXR without pneumoperitoneum   While in the ED, had only 1 episode of diarrhea early in course, none since. Abdominal pain also remains resolved.   Was evaluated by surgery in ED given imaging findings, abdominal pain had resolved at that time with benign exam, suspect imaging/lab findings due to vomiting and dehydration from gastroenteritis.   Was given Zosyn, PPI 80mg IV, Zofran 4mg IV x1,

## 2024-11-05 NOTE — H&P ADULT - NSHPREVIEWOFSYSTEMS_GEN_ALL_CORE
REVIEW OF SYSTEMS:  CONSTITUTIONAL: No fevers or chills  EYES/ENT: No visual changes;  No vertigo or throat pain   NECK: No pain or stiffness  RESPIRATORY: No cough, wheezing, hemoptysis; No shortness of breath  CARDIOVASCULAR: No chest pain or palpitations  GASTROINTESTINAL: see HPI   GENITOURINARY: No dysuria, frequency or hematuria  NEUROLOGICAL: No syncope or dizziness  SKIN: No itching, rashes

## 2024-11-05 NOTE — H&P ADULT - NSHPSOCIALHISTORY_GEN_ALL_CORE
Remote smoking history, quit 30+ yrs ago. Social alcohol use. No illicit drug use. Ambulates independently. Lives with wife and children

## 2024-11-05 NOTE — ED ADULT NURSE REASSESSMENT NOTE - NS ED NURSE REASSESS COMMENT FT1
in bed A and Ox3 in NAD resting comfortably, endorses no complaints, reports did not have any loose stools overnight, pt is requesting food, per note plan for clear liquid diet this AM, provider contacted [phone], per DEMOND Tirado plan a this time is to wait fr GI's clearance prior to diet change. pending bed assignment at this time.

## 2024-11-05 NOTE — H&P ADULT - PROBLEM SELECTOR PLAN 1
p/w 10+ episodes of vomiting, 20+ episodes of watery diarrhea in setting of bandemia 27%, mildly elevated lactate, and gastric emphysema on CT A/P. Benign abdominal exam, not septic. Vomiting, diarrhea, and abdominal pain now all resolved   - ddx for radiographic findings include infectious emphysematous gastritis vs gastric ischemia vs PUD vs 2/2 vomiting/increased intragastric pressure. Was evaluated by surgery in ED, suspect CT findings due to vomiting   - s/p 1L IVF in ED, still with elevated lactate to 3.1, suspect due to dehydration given overall well appearing and benign exam     - bandemia suggesting potential bacterial gastroenteritis, however, given symptoms resolving, would monitor off antibiotics  - send stool studies (GI PCR, stool cx, C. diff) if diarrhea recurs   - f/u blood cultures   - serial abdominal exam  - give another 1L IVF  - NPO for now, if remains without abdominal pain in AM and lactate downtrends, start clear liquid diet and advance as tolerated p/w 10+ episodes of vomiting, 20+ episodes of watery diarrhea in setting of bandemia 27%, mildly elevated lactate, and gastric emphysema on CT A/P. Benign abdominal exam, not septic. Vomiting, diarrhea, and abdominal pain now all resolved   - ddx for radiographic findings include infectious emphysematous gastritis vs gastric ischemia vs PUD vs 2/2 vomiting/increased intragastric pressure. Was evaluated by surgery in ED, suspect CT findings due to vomiting   - s/p 1L IVF in ED, still with elevated lactate to 3.1, suspect due to dehydration given overall well appearing and benign exam     - bandemia suggesting potential bacterial gastroenteritis, however, given symptoms resolving, would monitor off antibiotics  - send stool studies (GI PCR, stool cx, C. diff) if diarrhea recurs   - f/u blood cultures   - serial abdominal exam  - give another 1L IVF, f/u repeat lactate   - NPO for now, if remains without abdominal pain in AM and lactate downtrends, start clear liquid diet and advance as tolerated

## 2024-11-05 NOTE — H&P ADULT - PROBLEM SELECTOR PLAN 2
s/p [facility] 4/8/24 with RHONDA to RCA   follows Dr. Peewee Long, cardiologist, last saw 9/5/24   last TTE with grade 1 diastolic dysfunction   - continue home aspirin 81mg daily, Plavix 75mg daily, Toprol XL 25mg daily, lisinopril 10mg daily, atorvastatin 80mg qhs

## 2024-11-05 NOTE — PROGRESS NOTE ADULT - PROBLEM SELECTOR PLAN 1
p/w 10+ episodes of vomiting, 20+ episodes of watery diarrhea in setting of bandemia 27%, mildly elevated lactate, and gastric emphysema on CT A/P. Benign abdominal exam, not septic. Vomiting, diarrhea, and abdominal pain now all resolved   - ddx for radiographic findings include infectious emphysematous gastritis vs gastric ischemia vs PUD vs 2/2 vomiting/increased intragastric pressure. Was evaluated by surgery in ED, suspect CT findings due to vomiting   - surgery recs appreciated: f/u serial exams  - s/p 1L IVF in ED, still with elevated lactate to 3.1, suspect due to dehydration given overall well appearing and benign exam   - bandemia suggesting potential bacterial gastroenteritis, however, given symptoms resolving, would monitor off antibiotics  - send stool studies (GI PCR, stool cx, C. diff) if diarrhea recurs   - f/u blood cultures   - serial abdominal exam  - give another 1L IVF, f/u repeat lactate   - Pt advanced to clear liquid diet and can be further advanced as tolerated

## 2024-11-05 NOTE — H&P ADULT - PROBLEM SELECTOR PLAN 5
Diet: NPO for now, advance as above   DVT ppx: SCD's, low risk   Dispo: home pending clinical improvement

## 2024-11-06 ENCOUNTER — TRANSCRIPTION ENCOUNTER (OUTPATIENT)
Age: 69
End: 2024-11-06

## 2024-11-06 VITALS
SYSTOLIC BLOOD PRESSURE: 128 MMHG | TEMPERATURE: 98 F | DIASTOLIC BLOOD PRESSURE: 70 MMHG | RESPIRATION RATE: 18 BRPM | HEART RATE: 73 BPM | OXYGEN SATURATION: 99 %

## 2024-11-06 LAB
A1C WITH ESTIMATED AVERAGE GLUCOSE RESULT: 9.7 % — HIGH (ref 4–5.6)
ANION GAP SERPL CALC-SCNC: 12 MMOL/L — SIGNIFICANT CHANGE UP (ref 7–14)
BASOPHILS # BLD AUTO: 0.02 K/UL — SIGNIFICANT CHANGE UP (ref 0–0.2)
BASOPHILS NFR BLD AUTO: 0.2 % — SIGNIFICANT CHANGE UP (ref 0–2)
BUN SERPL-MCNC: 13 MG/DL — SIGNIFICANT CHANGE UP (ref 7–23)
CALCIUM SERPL-MCNC: 8.2 MG/DL — LOW (ref 8.4–10.5)
CHLORIDE SERPL-SCNC: 103 MMOL/L — SIGNIFICANT CHANGE UP (ref 98–107)
CO2 SERPL-SCNC: 23 MMOL/L — SIGNIFICANT CHANGE UP (ref 22–31)
CREAT SERPL-MCNC: 1.04 MG/DL — SIGNIFICANT CHANGE UP (ref 0.5–1.3)
EGFR: 77 ML/MIN/1.73M2 — SIGNIFICANT CHANGE UP
EOSINOPHIL # BLD AUTO: 0.09 K/UL — SIGNIFICANT CHANGE UP (ref 0–0.5)
EOSINOPHIL NFR BLD AUTO: 0.9 % — SIGNIFICANT CHANGE UP (ref 0–6)
ESTIMATED AVERAGE GLUCOSE: 232 — SIGNIFICANT CHANGE UP
GLUCOSE BLDC GLUCOMTR-MCNC: 159 MG/DL — HIGH (ref 70–99)
GLUCOSE SERPL-MCNC: 133 MG/DL — HIGH (ref 70–99)
HCT VFR BLD CALC: 41.9 % — SIGNIFICANT CHANGE UP (ref 39–50)
HGB BLD-MCNC: 13.9 G/DL — SIGNIFICANT CHANGE UP (ref 13–17)
IANC: 7.42 K/UL — HIGH (ref 1.8–7.4)
IMM GRANULOCYTES NFR BLD AUTO: 0.9 % — SIGNIFICANT CHANGE UP (ref 0–0.9)
LYMPHOCYTES # BLD AUTO: 1.82 K/UL — SIGNIFICANT CHANGE UP (ref 1–3.3)
LYMPHOCYTES # BLD AUTO: 18.2 % — SIGNIFICANT CHANGE UP (ref 13–44)
MAGNESIUM SERPL-MCNC: 2.2 MG/DL — SIGNIFICANT CHANGE UP (ref 1.6–2.6)
MCHC RBC-ENTMCNC: 26.8 PG — LOW (ref 27–34)
MCHC RBC-ENTMCNC: 33.2 G/DL — SIGNIFICANT CHANGE UP (ref 32–36)
MCV RBC AUTO: 80.9 FL — SIGNIFICANT CHANGE UP (ref 80–100)
MONOCYTES # BLD AUTO: 0.55 K/UL — SIGNIFICANT CHANGE UP (ref 0–0.9)
MONOCYTES NFR BLD AUTO: 5.5 % — SIGNIFICANT CHANGE UP (ref 2–14)
MRSA PCR RESULT.: SIGNIFICANT CHANGE UP
NEUTROPHILS # BLD AUTO: 7.42 K/UL — HIGH (ref 1.8–7.4)
NEUTROPHILS NFR BLD AUTO: 74.3 % — SIGNIFICANT CHANGE UP (ref 43–77)
NRBC # BLD: 0 /100 WBCS — SIGNIFICANT CHANGE UP (ref 0–0)
NRBC # FLD: 0 K/UL — SIGNIFICANT CHANGE UP (ref 0–0)
PHOSPHATE SERPL-MCNC: 2.5 MG/DL — SIGNIFICANT CHANGE UP (ref 2.5–4.5)
PLATELET # BLD AUTO: 148 K/UL — LOW (ref 150–400)
POTASSIUM SERPL-MCNC: 4.3 MMOL/L — SIGNIFICANT CHANGE UP (ref 3.5–5.3)
POTASSIUM SERPL-SCNC: 4.3 MMOL/L — SIGNIFICANT CHANGE UP (ref 3.5–5.3)
RBC # BLD: 5.18 M/UL — SIGNIFICANT CHANGE UP (ref 4.2–5.8)
RBC # FLD: 14 % — SIGNIFICANT CHANGE UP (ref 10.3–14.5)
S AUREUS DNA NOSE QL NAA+PROBE: SIGNIFICANT CHANGE UP
SODIUM SERPL-SCNC: 138 MMOL/L — SIGNIFICANT CHANGE UP (ref 135–145)
WBC # BLD: 9.99 K/UL — SIGNIFICANT CHANGE UP (ref 3.8–10.5)
WBC # FLD AUTO: 9.99 K/UL — SIGNIFICANT CHANGE UP (ref 3.8–10.5)

## 2024-11-06 PROCEDURE — 99239 HOSP IP/OBS DSCHRG MGMT >30: CPT

## 2024-11-06 RX ORDER — CIPROFLOXACIN LACTATE 200MG/20ML
1 VIAL (ML) INTRAVENOUS
Qty: 5 | Refills: 0
Start: 2024-11-06 | End: 2024-11-08

## 2024-11-06 RX ORDER — CIPROFLOXACIN LACTATE 200MG/20ML
500 VIAL (ML) INTRAVENOUS EVERY 12 HOURS
Refills: 0 | Status: DISCONTINUED | OUTPATIENT
Start: 2024-11-06 | End: 2024-11-06

## 2024-11-06 RX ORDER — SODIUM CHLORIDE 9 MG/ML
1000 INJECTION, SOLUTION INTRAMUSCULAR; INTRAVENOUS; SUBCUTANEOUS ONCE
Refills: 0 | Status: COMPLETED | OUTPATIENT
Start: 2024-11-06 | End: 2024-11-06

## 2024-11-06 RX ADMIN — Medication 1: at 08:47

## 2024-11-06 RX ADMIN — Medication 500 MILLIGRAM(S): at 10:54

## 2024-11-06 RX ADMIN — CLOPIDOGREL 75 MILLIGRAM(S): 75 TABLET ORAL at 12:56

## 2024-11-06 RX ADMIN — Medication 81 MILLIGRAM(S): at 12:56

## 2024-11-06 RX ADMIN — SODIUM CHLORIDE 1000 MILLILITER(S): 9 INJECTION, SOLUTION INTRAMUSCULAR; INTRAVENOUS; SUBCUTANEOUS at 10:55

## 2024-11-06 RX ADMIN — Medication 10 MILLIGRAM(S): at 05:58

## 2024-11-06 RX ADMIN — Medication 25 MILLIGRAM(S): at 05:58

## 2024-11-06 RX ADMIN — Medication 1: at 12:55

## 2024-11-06 NOTE — DISCHARGE NOTE NURSING/CASE MANAGEMENT/SOCIAL WORK - NSDCPEFALRISK_GEN_ALL_CORE
For information on Fall & Injury Prevention, visit: [email or web address] OR  [email or web address] OR  https://www.cdc.gov/steadi/patient.html

## 2024-11-06 NOTE — PROGRESS NOTE ADULT - ASSESSMENT
68 yo M w/ PMHx of DM, HTN, HLD, CAD no significant PSHx who prsents to the ED for the second time today w/ persistent N/V and watery diarrhea x1 day. Patient was discharged home w/ acute radiographic findings however re-presents after persistent vomiting and watery diarrhea. CT A/P earlier today showing fluid in the ascending colon. Repeat CT A/P w/ increased gastric emphesema and adjacent mesenteric venous gas and portal venous gas. Surgery consulted for evaluation.     Recommendations:   - No acute surgical intervention at this time     - Patient denies abdominal pain, abdominal exam benign     - Suspect symptoms and radiographic findings 2/2 dehydration and persistent emesis   - lactate now normal (2.8 > 3.1> 1.2)  - care per medicine team    B Team  [id number]  
69M with PMH CAD s/p RHONDA to RCA (4/8/24 on DAPT), HTN, HLD, and T2DM returning to hospital for multiple episodes of vomiting and diarrhea x 1 day. Admitted for management given inability to tolerate PO and radiographic findings of gastric emphysema in setting of mildly elevated lactate. Overall well appearing with benign abdominal exam. 
68 yo M w/ PMHx of DM, HTN, HLD, CAD no significant PSHx who prsents to the ED for the second time today w/ persistent N/V and watery diarrhea x1 day. Patient was discharged home w/ acute radiographic findings however re-presents after persistent vomiting and watery diarrhea. CT A/P earlier today showing fluid in the ascending colon. Repeat CT A/P w/ increased gastric emphesema and adjacent mesenteric venous gas and portal venous gas. Surgery consulted for evaluation.     Recommendations:   - No acute surgical intervention at this time     - Patient denies abdominal pain, abdominal exam benign     - Suspect symptoms and radiographic findings 2/2 dehydration and persistent emesis   - Recommend medicine admission for management of gastroenteritis and dehydration      - Continue IVF resuscitation   - Trend lactate, now normal (2.8 > 3.1> 1.2)    B Team  [id number]    
69M with PMH CAD s/p RHONDA to RCA (4/8/24 on DAPT), HTN, HLD, and T2DM returning to hospital for multiple episodes of vomiting and diarrhea x 1 day. Admitted for management given inability to tolerate PO and radiographic findings of gastric emphysema in setting of mildly elevated lactate. Overall well appearing with benign abdominal exam.

## 2024-11-06 NOTE — PROGRESS NOTE ADULT - PROBLEM SELECTOR PLAN 3
home regimen: mounjaro   - FS TID QAC + bedtime with low correctional scale  - a1c 9.7%
home regimen: mounjaro   - FS TID QAC + bedtime with low correctional scale

## 2024-11-06 NOTE — PROGRESS NOTE ADULT - PROBLEM SELECTOR PLAN 5
Diet: regular diet  DVT ppx: SCD's, low risk   Dispo: home today if clinically improving
Diet: clears, advance as above   DVT ppx: SCD's, low risk   Dispo: home pending clinical improvement

## 2024-11-06 NOTE — PROGRESS NOTE ADULT - PROBLEM SELECTOR PLAN 4
pt does not know list of medications but states all providers within [facility]. Medrec completed referencing surescrilonnie and GIULIANA. Daughter to bring list of medications when she visits
pt does not know list of medications but states all providers within [facility]. Medrec completed referencing surescrilonnie and GIULIANA. Daughter to bring list of medications when she visits

## 2024-11-06 NOTE — PROGRESS NOTE ADULT - PROBLEM SELECTOR PLAN 1
p/w 10+ episodes of vomiting, 20+ episodes of watery diarrhea in setting of bandemia 27%, mildly elevated lactate, and gastric emphysema on CT A/P. Benign abdominal exam, not septic. Vomiting, diarrhea, and abdominal pain now all resolved   - ddx for radiographic findings include infectious emphysematous gastritis vs gastric ischemia vs PUD vs 2/2 vomiting/increased intragastric pressure. Was evaluated by surgery in ED, suspect CT findings due to vomiting   - surgery recs appreciated: f/u serial exams  - s/p 1L IVF in ED, still with elevated lactate to 3.1, will give 1L NS fluid bolus  - bandemia suggesting potential bacterial gastroenteritis, will give 3 days of Cipro 500 mg bid, overall clinical improving, advance diet to regular  - diarrhea resolving, if recurrent send GI pcr/c diff  - f/u blood cultures (ngtd), ucx normal coretta  - serial abdominal exam  - give another 1L IVF, f/u repeat lactate   - Pt advanced to clear liquid diet and can be further advanced as tolerated p/w 10+ episodes of vomiting, 20+ episodes of watery diarrhea in setting of bandemia 27%, mildly elevated lactate, and gastric emphysema on CT A/P. Benign abdominal exam, not septic. Vomiting, diarrhea, and abdominal pain now all resolved   - ddx for radiographic findings include infectious emphysematous gastritis vs gastric ischemia vs PUD vs 2/2 vomiting/increased intragastric pressure. Was evaluated by surgery in ED, suspect CT findings due to vomiting   - surgery recs appreciated: f/u serial exams  - s/p 1L IVF in ED, still with elevated lactate to 3.1, will give 1L NS fluid bolus  - bandemia suggesting potential bacterial gastroenteritis, will give 3 days of Cipro 500 mg bid, overall clinical improving, advance diet to regular  - diarrhea resolving, if recurrent send GI pcr/c diff  - f/u blood cultures (ngtd), ucx normal coretta  - serial abdominal exam  - if still here tomorrow, repeat lactate  - Pt advanced to clear liquid diet and can be further advanced as tolerated p/w 10+ episodes of vomiting, 20+ episodes of watery diarrhea in setting of bandemia 27%, mildly elevated lactate, and gastric emphysema on CT A/P. Benign abdominal exam, not septic. Vomiting, diarrhea, and abdominal pain now all resolved   - ddx for radiographic findings include infectious emphysematous gastritis vs gastric ischemia vs PUD vs 2/2 vomiting/increased intragastric pressure. Was evaluated by surgery in ED, suspect CT findings due to vomiting   - surgery recs appreciated: f/u serial exams  - s/p 1L IVF in ED, still with elevated lactate to 3.1, will give 1L NS fluid bolus  - bandemia suggesting potential bacterial gastroenteritis, will give 3 days of Cipro 500 mg bid, overall clinical improving, advance diet to regular  - diarrhea resolving, if recurrent send GI pcr/c diff  - f/u blood cultures (ngtd), ucx normal coretta  - serial abdominal exam  - if still here tomorrow, repeat lactate  - DC home today if tolerating regular diet and clinically improving

## 2024-11-06 NOTE — DISCHARGE NOTE NURSING/CASE MANAGEMENT/SOCIAL WORK - FINANCIAL ASSISTANCE
[facility] provides services at a reduced cost to those who are determined to be eligible through [facility]’s financial assistance program. Information regarding [facility]’s financial assistance program can be found by going to [email or web address] or by calling [phone].

## 2024-11-06 NOTE — DISCHARGE NOTE PROVIDER - NSDCFUSCHEDAPPT_GEN_ALL_CORE_FT
Peewee Long Physician [facility]  CARDIOLOGY [id number]  Scheduled Appointment: 12/05/2024

## 2024-11-06 NOTE — PROGRESS NOTE ADULT - PROBLEM SELECTOR PLAN 2
s/p [facility] 4/8/24 with RHONDA to RCA   - follows Dr. Peewee Long, cardiologist, last saw 9/5/24   - last TTE with grade 1 diastolic dysfunction   - continue home aspirin 81mg daily, Plavix 75mg daily, Toprol XL 25mg daily, lisinopril 10mg daily, atorvastatin 80mg qhs
s/p [facility] 4/8/24 with RHONDA to RCA   - follows Dr. Peewee Long, cardiologist, last saw 9/5/24   - last TTE with grade 1 diastolic dysfunction   - continue home aspirin 81mg daily, Plavix 75mg daily, Toprol XL 25mg daily, lisinopril 10mg daily, atorvastatin 80mg qhs

## 2024-11-06 NOTE — DISCHARGE NOTE NURSING/CASE MANAGEMENT/SOCIAL WORK - PATIENT PORTAL LINK FT
You can access the FollowMyHealth Patient Portal offered by [facility] by registering at the following website: [email or web address]. By joining [employer]’s FollowMyHealth portal, you will also be able to view your health information using other applications (apps) compatible with our system.

## 2024-11-06 NOTE — PROGRESS NOTE ADULT - SUBJECTIVE AND OBJECTIVE BOX
SUBJECTIVE: Patient seen and examined on AM rounds. Denies abdominal pain, nausea, vomiting, diarrhea.     Vital Signs Last 24 Hrs  T(C): 36.6 (06 Nov 2024 05:44), Max: 36.8 (05 Nov 2024 11:52)  T(F): 97.9 (06 Nov 2024 05:44), Max: 98.3 (05 Nov 2024 11:52)  HR: 70 (06 Nov 2024 05:44) (64 - 88)  BP: 129/57 (06 Nov 2024 05:44) (113/58 - 153/73)  BP(mean): --  RR: 17 (06 Nov 2024 05:44) (16 - 18)  SpO2: 99% (06 Nov 2024 05:44) (96% - 100%)    Parameters below as of 06 Nov 2024 05:44  Patient On (Oxygen Delivery Method): room air        I&O's Detail      PHYSICAL EXAM:  General: No acute distress  Respiratory: Nonlabored  Cardiovascular: RRR  Abdominal: Soft, nondistended, nontender. No rebound or guarding. No organomegaly, no palpable mass.  Extremities: Warm    LABS:                        12.4   10.78 )-----------( 146      ( 05 Nov 2024 06:06 )             36.5     11-05    137  |  104  |  19  ----------------------------<  149[H]  4.0   |  19[L]  |  1.19    Ca    7.9[L]      05 Nov 2024 06:06  Phos  3.5     11-05  Mg     1.90     11-05    TPro  6.1  /  Alb  3.5  /  TBili  0.3  /  DBili  x   /  AST  18  /  ALT  19  /  AlkPhos  50  11-05    PT/INR - ( 04 Nov 2024 23:48 )   PT: 12.6 sec;   INR: 1.09 ratio         PTT - ( 04 Nov 2024 23:48 )  PTT:21.4 sec  Urinalysis Basic - ( 05 Nov 2024 06:06 )    Color: x / Appearance: x / SG: x / pH: x  Gluc: 149 mg/dL / Ketone: x  / Bili: x / Urobili: x   Blood: x / Protein: x / Nitrite: x   Leuk Esterase: x / RBC: x / WBC x   Sq Epi: x / Non Sq Epi: x / Bacteria: x        RADIOLOGY & ADDITIONAL STUDIES:  
SUBJECTIVE: Patient seen and examined on AM rounds. Continues to deny abdominal pain. Denies nausea or vomiting at the time of exam. States last flatus and BM was the night prior.     Vital Signs Last 24 Hrs  T(C): 36.8 (05 Nov 2024 11:52), Max: 37 (04 Nov 2024 23:50)  T(F): 98.3 (05 Nov 2024 11:52), Max: 98.6 (04 Nov 2024 23:50)  HR: 77 (05 Nov 2024 11:52) (77 - 88)  BP: 124/74 (05 Nov 2024 11:52) (104/61 - 155/77)  BP(mean): --  RR: 18 (05 Nov 2024 11:52) (16 - 18)  SpO2: 98% (05 Nov 2024 11:52) (97% - 100%)    Parameters below as of 05 Nov 2024 11:52  Patient On (Oxygen Delivery Method): room air        I&O's Detail      PHYSICAL EXAM:  General: No acute distress  Respiratory: Nonlabored  Cardiovascular: RRR  Abdominal: Soft, nondistended, nontender. No rebound or guarding. No organomegaly, no palpable mass.  Extremities: Warm      LABS:                        12.4   10.78 )-----------( 146      ( 05 Nov 2024 06:06 )             36.5     11-05    137  |  104  |  19  ----------------------------<  149[H]  4.0   |  19[L]  |  1.19    Ca    7.9[L]      05 Nov 2024 06:06  Phos  3.5     11-05  Mg     1.90     11-05    TPro  6.1  /  Alb  3.5  /  TBili  0.3  /  DBili  x   /  AST  18  /  ALT  19  /  AlkPhos  50  11-05    PT/INR - ( 04 Nov 2024 23:48 )   PT: 12.6 sec;   INR: 1.09 ratio         PTT - ( 04 Nov 2024 23:48 )  PTT:21.4 sec  Urinalysis Basic - ( 05 Nov 2024 06:06 )    Color: x / Appearance: x / SG: x / pH: x  Gluc: 149 mg/dL / Ketone: x  / Bili: x / Urobili: x   Blood: x / Protein: x / Nitrite: x   Leuk Esterase: x / RBC: x / WBC x   Sq Epi: x / Non Sq Epi: x / Bacteria: x        RADIOLOGY & ADDITIONAL STUDIES:  
Dr. Margie Aguilar  Pager [phone]    PROGRESS NOTE:     Patient is a 69y old  Male who presents with a chief complaint of gastroenteritis, inability to tolerate PO (06 Nov 2024 15:01)      SUBJECTIVE / OVERNIGHT EVENTS: denies chest pain or sob   ADDITIONAL REVIEW OF SYSTEMS: afebrile , reports he had two hamburger and then developed abd pain/n/v/diarrhea, now resolved, no further abd pain, afebrile, tolerating diet     MEDICATIONS  (STANDING):  aspirin enteric coated 81 milliGRAM(s) Oral daily  atorvastatin 80 milliGRAM(s) Oral at bedtime  ciprofloxacin     Tablet 500 milliGRAM(s) Oral every 12 hours  clopidogrel Tablet 75 milliGRAM(s) Oral daily  dextrose 5%. 1000 milliLiter(s) (100 mL/Hr) IV Continuous <Continuous>  dextrose 5%. 1000 milliLiter(s) (50 mL/Hr) IV Continuous <Continuous>  dextrose 50% Injectable 25 Gram(s) IV Push once  dextrose 50% Injectable 25 Gram(s) IV Push once  dextrose 50% Injectable 12.5 Gram(s) IV Push once  glucagon  Injectable 1 milliGRAM(s) IntraMuscular once  influenza  Vaccine (HIGH DOSE) 0.5 milliLiter(s) IntraMuscular once  insulin lispro (ADMELOG) corrective regimen sliding scale   SubCutaneous three times a day before meals  insulin lispro (ADMELOG) corrective regimen sliding scale   SubCutaneous at bedtime  latanoprost 0.005% Ophthalmic Solution 1 Drop(s) Both EYES at bedtime  lisinopril 10 milliGRAM(s) Oral daily  metoprolol succinate ER 25 milliGRAM(s) Oral daily    MEDICATIONS  (PRN):  acetaminophen     Tablet .. 650 milliGRAM(s) Oral every 6 hours PRN Temp greater or equal to 38C (100.4F), Mild Pain (1 - 3)  aluminum hydroxide/magnesium hydroxide/simethicone Suspension 30 milliLiter(s) Oral every 4 hours PRN Dyspepsia  dextrose Oral Gel 15 Gram(s) Oral once PRN Blood Glucose LESS THAN 70 milliGRAM(s)/deciliter  melatonin 3 milliGRAM(s) Oral at bedtime PRN Insomnia  ondansetron Injectable 4 milliGRAM(s) IV Push every 8 hours PRN Nausea and/or Vomiting      CAPILLARY BLOOD GLUCOSE      POCT Blood Glucose.: 182 mg/dL (06 Nov 2024 12:29)  POCT Blood Glucose.: 159 mg/dL (06 Nov 2024 08:21)  POCT Blood Glucose.: 150 mg/dL (05 Nov 2024 22:49)  POCT Blood Glucose.: 147 mg/dL (05 Nov 2024 17:15)  POCT Blood Glucose.: 159 mg/dL (05 Nov 2024 16:42)    I&O's Summary      PHYSICAL EXAM:  Vital Signs Last 24 Hrs  T(C): 36.7 (06 Nov 2024 07:58), Max: 36.7 (05 Nov 2024 17:15)  T(F): 98.1 (06 Nov 2024 07:58), Max: 98.1 (05 Nov 2024 17:15)  HR: 67 (06 Nov 2024 07:58) (64 - 88)  BP: 143/72 (06 Nov 2024 07:58) (113/58 - 153/73)  BP(mean): 115 (06 Nov 2024 07:58) (115 - 115)  RR: 18 (06 Nov 2024 07:58) (16 - 18)  SpO2: 98% (06 Nov 2024 07:58) (96% - 100%)    Parameters below as of 06 Nov 2024 07:58  Patient On (Oxygen Delivery Method): room air      CONSTITUTIONAL: nad  RESPIRATORY: Normal respiratory effort; lungs are clear to auscultation bilaterally  CARDIOVASCULAR: Regular rate and rhythm, normal S1 and S2, no murmur/rub/gallop; No lower extremity edema; Peripheral pulses are 2+ bilaterally  ABDOMEN: Nontender to palpation, normoactive bowel sounds, no rebound/guarding; No hepatosplenomegaly  MUSCULOSKELETAL: no clubbing or cyanosis of digits; no joint swelling or tenderness to palpation  PSYCH: A+O to person, place, and time; affect appropriate    LABS:                        13.9   9.99  )-----------( 148      ( 06 Nov 2024 07:20 )             41.9     11-06    138  |  103  |  13  ----------------------------<  133[H]  4.3   |  23  |  1.04    Ca    8.2[L]      06 Nov 2024 07:20  Phos  2.5     11-06  Mg     2.20     11-06    TPro  6.1  /  Alb  3.5  /  TBili  0.3  /  DBili  x   /  AST  18  /  ALT  19  /  AlkPhos  50  11-05    PT/INR - ( 04 Nov 2024 23:48 )   PT: 12.6 sec;   INR: 1.09 ratio         PTT - ( 04 Nov 2024 23:48 )  PTT:21.4 sec      Urinalysis Basic - ( 06 Nov 2024 07:20 )    Color: x / Appearance: x / SG: x / pH: x  Gluc: 133 mg/dL / Ketone: x  / Bili: x / Urobili: x   Blood: x / Protein: x / Nitrite: x   Leuk Esterase: x / RBC: x / WBC x   Sq Epi: x / Non Sq Epi: x / Bacteria: x        Culture - Blood (collected 04 Nov 2024 20:00)  Source: .Blood BLOOD  Preliminary Report (06 Nov 2024 03:01):    No growth at 24 hours    Culture - Blood (collected 04 Nov 2024 19:50)  Source: .Blood BLOOD  Preliminary Report (06 Nov 2024 03:01):    No growth at 24 hours    Culture - Urine (collected 04 Nov 2024 12:37)  Source: Clean Catch Clean Catch (Midstream)  Final Report (05 Nov 2024 09:21):    <10,000 CFU/mL Normal Urogenital Faith    Urinalysis with Rflx Culture (collected 04 Nov 2024 07:05)    RADIOLOGY & ADDITIONAL TESTS:  Results Reviewed:   Imaging Personally Reviewed:  < from: CT Abdomen and Pelvis w/ IV Cont (11.04.24 @ 22:17) >    IMPRESSION:  Gastric emphysema with adjacent mesenteric venous gas and portal venous   gas, new compared to the study performed earlier today.    This could represent infectious emphysematous gastritis or gastric   ischemia or may be due to underlying peptic ulcer disease. However this   can also be a benign finding secondary to vomiting/increased intragastric   pressure. No free air or abscess.        Electrocardiogram Personally Reviewed:    COORDINATION OF CARE:  Care Discussed with Consultants/Other Providers [Y/N]:  Prior or Outpatient Records Reviewed [Y/N]:  
[facility] Division of Hospital Medicine  Dallinana [email or web address] DO Anton  Available via MS Teams  Pager: [phone]    Patient is a 69y old  Male who presents with a chief complaint of gastroenteritis, inability to tolerate PO (05 Nov 2024 03:43)      SUBJECTIVE / OVERNIGHT EVENTS:    Pt seen and examined this morning. Pt states diarrhea has significantly improved as well as his abdominal pain. He states he has had recent antibiotic use but doesn't recall when or the name. He denies any current nausea or vomiting    ADDITIONAL REVIEW OF SYSTEMS:  No Fever, chills, chest pain, shortness of breath.     MEDICATIONS  (STANDING):  aspirin enteric coated 81 milliGRAM(s) Oral daily  atorvastatin 80 milliGRAM(s) Oral at bedtime  clopidogrel Tablet 75 milliGRAM(s) Oral daily  dextrose 5%. 1000 milliLiter(s) (100 mL/Hr) IV Continuous <Continuous>  dextrose 5%. 1000 milliLiter(s) (50 mL/Hr) IV Continuous <Continuous>  dextrose 50% Injectable 25 Gram(s) IV Push once  dextrose 50% Injectable 12.5 Gram(s) IV Push once  dextrose 50% Injectable 25 Gram(s) IV Push once  glucagon  Injectable 1 milliGRAM(s) IntraMuscular once  insulin lispro (ADMELOG) corrective regimen sliding scale   SubCutaneous every 6 hours  latanoprost 0.005% Ophthalmic Solution 1 Drop(s) Both EYES at bedtime  metoprolol succinate ER 25 milliGRAM(s) Oral daily    MEDICATIONS  (PRN):  acetaminophen     Tablet .. 650 milliGRAM(s) Oral every 6 hours PRN Temp greater or equal to 38C (100.4F), Mild Pain (1 - 3)  aluminum hydroxide/magnesium hydroxide/simethicone Suspension 30 milliLiter(s) Oral every 4 hours PRN Dyspepsia  dextrose Oral Gel 15 Gram(s) Oral once PRN Blood Glucose LESS THAN 70 milliGRAM(s)/deciliter  melatonin 3 milliGRAM(s) Oral at bedtime PRN Insomnia  ondansetron Injectable 4 milliGRAM(s) IV Push every 8 hours PRN Nausea and/or Vomiting      I&O's Summary      PHYSICAL EXAM:  Vital Signs Last 24 Hrs  T(C): 36.9 (05 Nov 2024 05:56), Max: 37 (04 Nov 2024 23:50)  T(F): 98.4 (05 Nov 2024 05:56), Max: 98.6 (04 Nov 2024 23:50)  HR: 77 (05 Nov 2024 05:56) (77 - 88)  BP: 104/61 (05 Nov 2024 05:56) (104/61 - 155/77)  BP(mean): --  RR: 16 (05 Nov 2024 05:56) (16 - 18)  SpO2: 97% (05 Nov 2024 05:56) (97% - 100%)    Parameters below as of 05 Nov 2024 05:56  Patient On (Oxygen Delivery Method): room air      CONSTITUTIONAL: NAD  EYES: PERRLA  ENMT: Moist oral mucosa  RESPIRATORY: Normal respiratory effort; lungs are clear to auscultation bilaterally  CARDIOVASCULAR: Regular rate and rhythm, normal S1 and S2, no murmur/rub/gallop  ABDOMEN: Nontender to palpation, normoactive bowel sounds  PSYCH: A+O to person, place, and time  NEUROLOGY: CN 2-12 are intact and symmetric; no gross sensory deficits     LABS:                        12.4   10.78 )-----------( 146      ( 05 Nov 2024 06:06 )             36.5     11-05    137  |  104  |  19  ----------------------------<  149[H]  4.0   |  19[L]  |  1.19    Ca    7.9[L]      05 Nov 2024 06:06  Phos  3.5     11-05  Mg     1.90     11-05    TPro  6.1  /  Alb  3.5  /  TBili  0.3  /  DBili  x   /  AST  18  /  ALT  19  /  AlkPhos  50  11-05    PT/INR - ( 04 Nov 2024 23:48 )   PT: 12.6 sec;   INR: 1.09 ratio         PTT - ( 04 Nov 2024 23:48 )  PTT:21.4 sec      Urinalysis Basic - ( 05 Nov 2024 06:06 )    Color: x / Appearance: x / SG: x / pH: x  Gluc: 149 mg/dL / Ketone: x  / Bili: x / Urobili: x   Blood: x / Protein: x / Nitrite: x   Leuk Esterase: x / RBC: x / WBC x   Sq Epi: x / Non Sq Epi: x / Bacteria: x        Culture - Urine (collected 04 Nov 2024 12:37)  Source: Clean Catch Clean Catch (Midstream)  Final Report (05 Nov 2024 09:21):    <10,000 CFU/mL Normal Urogenital Faith    Urinalysis with Rflx Culture (collected 04 Nov 2024 07:05)

## 2024-11-06 NOTE — DISCHARGE NOTE PROVIDER - NSDCCPCAREPLAN_GEN_ALL_CORE_FT
PRINCIPAL DISCHARGE DIAGNOSIS  Diagnosis: Acute gastroenteritis  Assessment and Plan of Treatment: treated with fluids and will give 3 days of Cipro, outpatient follow up with primary physician. You were seen by surgery, no surgical intervention.      SECONDARY DISCHARGE DIAGNOSES  Diagnosis: CAD (coronary artery disease)  Assessment and Plan of Treatment: continue home meds

## 2024-11-06 NOTE — DISCHARGE NOTE PROVIDER - HOSPITAL COURSE
69M with PMH CAD s/p RHONDA to RCA (4/8/24 on DAPT), HTN, HLD, and T2DM returning to hospital for multiple episodes of vomiting and diarrhea x 1 day. Admitted for management given inability to tolerate PO and radiographic findings of gastric emphysema in setting of mildly elevated lactate. Overall well appearing with benign abdominal exam.        Problem/Plan - 1:  ·  Problem: Gastroenteritis.   ·  Plan: p/w 10+ episodes of vomiting, 20+ episodes of watery diarrhea in setting of bandemia 27%, mildly elevated lactate, and gastric emphysema on CT A/P. Benign abdominal exam, not septic. Vomiting, diarrhea, and abdominal pain now all resolved   - ddx for radiographic findings include infectious emphysematous gastritis vs gastric ischemia vs PUD vs 2/2 vomiting/increased intragastric pressure. Was evaluated by surgery in ED, suspect CT findings due to vomiting   - surgery recs appreciated: no acute surgical intervention, f/u serial exams  - s/p 1L IVF in ED, still with elevated lactate to 3.1, will give 1L NS fluid bolus  - bandemia suggesting potential bacterial gastroenteritis, will give 3 days of Cipro 500 mg bid, overall clinical improving, advance diet to regular  - diarrhea resolving, abdominal pain resolved, diet advanced to regular   - f/u blood cultures (ngtd), ucx normal coretta  - medically optimized for discharge      Problem/Plan - 2:  ·  Problem: CAD (coronary artery disease).   ·  Plan: s/p C 4/8/24 with RHONDA to RCA   - follows Dr. Peewee Long, cardiologist, last saw 9/5/24   - last TTE with grade 1 diastolic dysfunction   - continue home aspirin 81mg daily, Plavix 75mg daily, Toprol XL 25mg daily, lisinopril 10mg daily, atorvastatin 80mg qhs.     Problem/Plan - 3:  ·  Problem: Type 2 diabetes mellitus.   ·  Plan: home regimen: mounjaro   - FS TID QAC + bedtime with low correctional scale  - a1c 9.7%.

## 2024-11-10 LAB
CULTURE RESULTS: SIGNIFICANT CHANGE UP
CULTURE RESULTS: SIGNIFICANT CHANGE UP
SPECIMEN SOURCE: SIGNIFICANT CHANGE UP
SPECIMEN SOURCE: SIGNIFICANT CHANGE UP

## 2024-12-05 ENCOUNTER — APPOINTMENT (OUTPATIENT)
Dept: CARDIOLOGY | Facility: CLINIC | Age: 69
End: 2024-12-05
Payer: COMMERCIAL

## 2024-12-05 ENCOUNTER — NON-APPOINTMENT (OUTPATIENT)
Age: 69
End: 2024-12-05

## 2024-12-05 VITALS
OXYGEN SATURATION: 94 % | SYSTOLIC BLOOD PRESSURE: 131 MMHG | TEMPERATURE: 98 F | WEIGHT: 180 LBS | HEIGHT: 69 IN | DIASTOLIC BLOOD PRESSURE: 77 MMHG | HEART RATE: 56 BPM | BODY MASS INDEX: 26.66 KG/M2

## 2024-12-05 DIAGNOSIS — R07.9 CHEST PAIN, UNSPECIFIED: ICD-10-CM

## 2024-12-05 DIAGNOSIS — I10 ESSENTIAL (PRIMARY) HYPERTENSION: ICD-10-CM

## 2024-12-05 DIAGNOSIS — Z13.6 ENCOUNTER FOR SCREENING FOR CARDIOVASCULAR DISORDERS: ICD-10-CM

## 2024-12-05 DIAGNOSIS — I25.10 ATHEROSCLEROTIC HEART DISEASE OF NATIVE CORONARY ARTERY W/OUT ANGINA PECTORIS: ICD-10-CM

## 2024-12-05 PROCEDURE — 93000 ELECTROCARDIOGRAM COMPLETE: CPT

## 2024-12-05 PROCEDURE — G2211 COMPLEX E/M VISIT ADD ON: CPT | Mod: NC

## 2024-12-05 PROCEDURE — 99214 OFFICE O/P EST MOD 30 MIN: CPT

## 2024-12-23 NOTE — ED PROVIDER NOTE - BIRTH SEX
Receipt of incoming records via fax from [facility]. Already available in media. Duplicate. JEFF,Rn    Male

## 2024-12-24 PROBLEM — F10.90 ALCOHOL USE: Status: ACTIVE | Noted: 2017-01-05

## 2025-01-02 NOTE — ED ADULT NURSE NOTE - NS ED NURSE LEVEL OF CONSCIOUSNESS ORIENTATION
HR=86 bpm, NIBP=98/65 mmhg, SpO2=90 %, Resp=17 B/min, EtCO2=33 mmHg, Apnea=0 Seconds, Comment=SR Oriented - self; Oriented - place; Oriented - time

## 2025-02-05 ENCOUNTER — APPOINTMENT (OUTPATIENT)
Dept: OPHTHALMOLOGY | Facility: CLINIC | Age: 70
End: 2025-02-05

## 2025-03-17 ENCOUNTER — NON-APPOINTMENT (OUTPATIENT)
Age: 70
End: 2025-03-17

## 2025-03-17 ENCOUNTER — APPOINTMENT (OUTPATIENT)
Dept: OPHTHALMOLOGY | Facility: CLINIC | Age: 70
End: 2025-03-17
Payer: COMMERCIAL

## 2025-03-17 PROCEDURE — 92250 FUNDUS PHOTOGRAPHY W/I&R: CPT

## 2025-03-17 PROCEDURE — 92014 COMPRE OPH EXAM EST PT 1/>: CPT

## 2025-03-17 PROCEDURE — 92083 EXTENDED VISUAL FIELD XM: CPT

## 2025-04-24 ENCOUNTER — NON-APPOINTMENT (OUTPATIENT)
Age: 70
End: 2025-04-24

## 2025-04-24 ENCOUNTER — APPOINTMENT (OUTPATIENT)
Dept: CARDIOLOGY | Facility: CLINIC | Age: 70
End: 2025-04-24
Payer: COMMERCIAL

## 2025-04-24 VITALS — SYSTOLIC BLOOD PRESSURE: 128 MMHG | DIASTOLIC BLOOD PRESSURE: 67 MMHG

## 2025-04-24 VITALS
HEART RATE: 54 BPM | HEIGHT: 69 IN | OXYGEN SATURATION: 98 % | TEMPERATURE: 97.5 F | DIASTOLIC BLOOD PRESSURE: 73 MMHG | BODY MASS INDEX: 25.33 KG/M2 | WEIGHT: 171 LBS | SYSTOLIC BLOOD PRESSURE: 132 MMHG

## 2025-04-24 DIAGNOSIS — I10 ESSENTIAL (PRIMARY) HYPERTENSION: ICD-10-CM

## 2025-04-24 DIAGNOSIS — R07.9 CHEST PAIN, UNSPECIFIED: ICD-10-CM

## 2025-04-24 DIAGNOSIS — Z13.6 ENCOUNTER FOR SCREENING FOR CARDIOVASCULAR DISORDERS: ICD-10-CM

## 2025-04-24 DIAGNOSIS — I25.10 ATHEROSCLEROTIC HEART DISEASE OF NATIVE CORONARY ARTERY W/OUT ANGINA PECTORIS: ICD-10-CM

## 2025-04-24 PROCEDURE — 93000 ELECTROCARDIOGRAM COMPLETE: CPT

## 2025-04-24 PROCEDURE — 99214 OFFICE O/P EST MOD 30 MIN: CPT

## 2025-05-30 ENCOUNTER — APPOINTMENT (OUTPATIENT)
Dept: GASTROENTEROLOGY | Facility: CLINIC | Age: 70
End: 2025-05-30
Payer: COMMERCIAL

## 2025-05-30 VITALS
DIASTOLIC BLOOD PRESSURE: 76 MMHG | WEIGHT: 175 LBS | HEART RATE: 64 BPM | HEIGHT: 69 IN | BODY MASS INDEX: 25.92 KG/M2 | TEMPERATURE: 98.5 F | SYSTOLIC BLOOD PRESSURE: 138 MMHG | OXYGEN SATURATION: 98 %

## 2025-05-30 DIAGNOSIS — I10 ESSENTIAL (PRIMARY) HYPERTENSION: ICD-10-CM

## 2025-05-30 DIAGNOSIS — E11.9 TYPE 2 DIABETES MELLITUS W/OUT COMPLICATIONS: ICD-10-CM

## 2025-05-30 DIAGNOSIS — K63.5 POLYP OF COLON: ICD-10-CM

## 2025-05-30 DIAGNOSIS — I25.10 ATHEROSCLEROTIC HEART DISEASE OF NATIVE CORONARY ARTERY W/OUT ANGINA PECTORIS: ICD-10-CM

## 2025-05-30 DIAGNOSIS — Z12.11 ENCOUNTER FOR SCREENING FOR MALIGNANT NEOPLASM OF COLON: ICD-10-CM

## 2025-05-30 DIAGNOSIS — K57.30 DIVERTICULOSIS OF LARGE INTESTINE W/OUT PERFORATION OR ABSCESS W/OUT BLEEDING: ICD-10-CM

## 2025-05-30 PROCEDURE — 99204 OFFICE O/P NEW MOD 45 MIN: CPT

## 2025-05-30 RX ORDER — SODIUM SULFATE, POTASSIUM SULFATE AND MAGNESIUM SULFATE 1.6; 3.13; 17.5 G/177ML; G/177ML; G/177ML
17.5-3.13-1.6 SOLUTION ORAL
Qty: 2 | Refills: 0 | Status: ACTIVE | COMMUNITY
Start: 2025-05-30 | End: 1900-01-01

## 2025-07-21 ENCOUNTER — EMERGENCY (EMERGENCY)
Facility: HOSPITAL | Age: 70
LOS: 1 days | End: 2025-07-21
Attending: STUDENT IN AN ORGANIZED HEALTH CARE EDUCATION/TRAINING PROGRAM | Admitting: STUDENT IN AN ORGANIZED HEALTH CARE EDUCATION/TRAINING PROGRAM
Payer: OTHER MISCELLANEOUS

## 2025-07-21 VITALS
SYSTOLIC BLOOD PRESSURE: 137 MMHG | OXYGEN SATURATION: 97 % | HEIGHT: 67 IN | RESPIRATION RATE: 16 BRPM | DIASTOLIC BLOOD PRESSURE: 74 MMHG | HEART RATE: 57 BPM | WEIGHT: 175.05 LBS | TEMPERATURE: 98 F

## 2025-07-21 DIAGNOSIS — Z98.890 OTHER SPECIFIED POSTPROCEDURAL STATES: Chronic | ICD-10-CM

## 2025-07-21 LAB
ALBUMIN SERPL ELPH-MCNC: 3.9 G/DL — SIGNIFICANT CHANGE UP (ref 3.3–5)
ALP SERPL-CCNC: 81 U/L — SIGNIFICANT CHANGE UP (ref 40–120)
ALT FLD-CCNC: 25 U/L — SIGNIFICANT CHANGE UP (ref 4–41)
ANION GAP SERPL CALC-SCNC: 12 MMOL/L — SIGNIFICANT CHANGE UP (ref 7–14)
AST SERPL-CCNC: 19 U/L — SIGNIFICANT CHANGE UP (ref 4–40)
B-OH-BUTYR SERPL-SCNC: <0 MMOL/L — SIGNIFICANT CHANGE UP (ref 0–0.4)
BASOPHILS # BLD AUTO: 0.03 K/UL — SIGNIFICANT CHANGE UP (ref 0–0.2)
BASOPHILS NFR BLD AUTO: 0.4 % — SIGNIFICANT CHANGE UP (ref 0–2)
BILIRUB SERPL-MCNC: <0.2 MG/DL — SIGNIFICANT CHANGE UP (ref 0.2–1.2)
BUN SERPL-MCNC: 16 MG/DL — SIGNIFICANT CHANGE UP (ref 7–23)
CALCIUM SERPL-MCNC: 9.2 MG/DL — SIGNIFICANT CHANGE UP (ref 8.4–10.5)
CHLORIDE SERPL-SCNC: 101 MMOL/L — SIGNIFICANT CHANGE UP (ref 98–107)
CO2 SERPL-SCNC: 24 MMOL/L — SIGNIFICANT CHANGE UP (ref 22–31)
CREAT SERPL-MCNC: 1.11 MG/DL — SIGNIFICANT CHANGE UP (ref 0.5–1.3)
EGFR: 71 ML/MIN/1.73M2 — SIGNIFICANT CHANGE UP
EGFR: 71 ML/MIN/1.73M2 — SIGNIFICANT CHANGE UP
EOSINOPHIL # BLD AUTO: 0.14 K/UL — SIGNIFICANT CHANGE UP (ref 0–0.5)
EOSINOPHIL NFR BLD AUTO: 2 % — SIGNIFICANT CHANGE UP (ref 0–6)
GAS PNL BLDV: SIGNIFICANT CHANGE UP
GLUCOSE SERPL-MCNC: 196 MG/DL — HIGH (ref 70–99)
HCT VFR BLD CALC: 38.1 % — LOW (ref 39–50)
HGB BLD-MCNC: 12.6 G/DL — LOW (ref 13–17)
IMM GRANULOCYTES # BLD AUTO: 0.01 K/UL — SIGNIFICANT CHANGE UP (ref 0–0.07)
IMM GRANULOCYTES NFR BLD AUTO: 0.1 % — SIGNIFICANT CHANGE UP (ref 0–0.9)
LYMPHOCYTES # BLD AUTO: 2.5 K/UL — SIGNIFICANT CHANGE UP (ref 1–3.3)
LYMPHOCYTES NFR BLD AUTO: 36 % — SIGNIFICANT CHANGE UP (ref 13–44)
MCHC RBC-ENTMCNC: 27.4 PG — SIGNIFICANT CHANGE UP (ref 27–34)
MCHC RBC-ENTMCNC: 33.1 G/DL — SIGNIFICANT CHANGE UP (ref 32–36)
MCV RBC AUTO: 82.8 FL — SIGNIFICANT CHANGE UP (ref 80–100)
MONOCYTES # BLD AUTO: 0.55 K/UL — SIGNIFICANT CHANGE UP (ref 0–0.9)
MONOCYTES NFR BLD AUTO: 7.9 % — SIGNIFICANT CHANGE UP (ref 2–14)
NEUTROPHILS # BLD AUTO: 3.72 K/UL — SIGNIFICANT CHANGE UP (ref 1.8–7.4)
NEUTROPHILS NFR BLD AUTO: 53.6 % — SIGNIFICANT CHANGE UP (ref 43–77)
NRBC # BLD AUTO: 0 K/UL — SIGNIFICANT CHANGE UP (ref 0–0)
NRBC # FLD: 0 K/UL — SIGNIFICANT CHANGE UP (ref 0–0)
NRBC BLD AUTO-RTO: 0 /100 WBCS — SIGNIFICANT CHANGE UP (ref 0–0)
PLATELET # BLD AUTO: 196 K/UL — SIGNIFICANT CHANGE UP (ref 150–400)
PMV BLD: 11.7 FL — SIGNIFICANT CHANGE UP (ref 7–13)
POTASSIUM SERPL-MCNC: 4.3 MMOL/L — SIGNIFICANT CHANGE UP (ref 3.5–5.3)
POTASSIUM SERPL-SCNC: 4.3 MMOL/L — SIGNIFICANT CHANGE UP (ref 3.5–5.3)
PROT SERPL-MCNC: 6.9 G/DL — SIGNIFICANT CHANGE UP (ref 6–8.3)
RBC # BLD: 4.6 M/UL — SIGNIFICANT CHANGE UP (ref 4.2–5.8)
RBC # FLD: 12.9 % — SIGNIFICANT CHANGE UP (ref 10.3–14.5)
SODIUM SERPL-SCNC: 137 MMOL/L — SIGNIFICANT CHANGE UP (ref 135–145)
WBC # BLD: 6.95 K/UL — SIGNIFICANT CHANGE UP (ref 3.8–10.5)
WBC # FLD AUTO: 6.95 K/UL — SIGNIFICANT CHANGE UP (ref 3.8–10.5)

## 2025-07-21 PROCEDURE — 73090 X-RAY EXAM OF FOREARM: CPT | Mod: 26,RT

## 2025-07-21 PROCEDURE — 99285 EMERGENCY DEPT VISIT HI MDM: CPT

## 2025-07-21 RX ORDER — ACETAMINOPHEN 500 MG/5ML
1000 LIQUID (ML) ORAL ONCE
Refills: 0 | Status: COMPLETED | OUTPATIENT
Start: 2025-07-21 | End: 2025-07-21

## 2025-07-21 RX ADMIN — Medication 400 MILLIGRAM(S): at 22:35

## 2025-07-21 NOTE — ED PROVIDER NOTE - PATIENT PORTAL LINK FT
You can access the FollowMyHealth Patient Portal offered by [facility] by registering at the following website: [email or web address]. By joining [facility]’s FollowMyHealth portal, you will also be able to view your health information using other applications (apps) compatible with our system.

## 2025-07-21 NOTE — ED PROVIDER NOTE - ATTENDING CONTRIBUTION TO CARE
69-year-old male with past medical history of diabetes, CAD coming in with pain in his right arm.  States 2 days ago he was at work and had injury while using pressure gun.  Concern increased swelling to his wound.  States it bled initally after the injury and he started developing swelling in the AC elbow region.  States the pain has been worsening which prompted him to come in.  No f/c/n/v.     Patient is nontoxic-appearing.  Noted to have a 2 x 1 cm indurated area in medial aspect of the right antecubital region with swelling distally going down to the forearm but 4 cm that is erythematous and tender to palpation.  No fluctuance in the area.  Patient also noted to have mild swelling in the proximal region with increased warmth to touch and tenderness to palpation.  Patient has full range of motion in the right elbow.    Differential diagnoses include but not limited to grease pressure gun injury, ?DKA -glucose is 300

## 2025-07-21 NOTE — ED PROVIDER NOTE - CARE PROVIDER_API CALL
Mikala Castellano)  Surgery of the Hand  [address], [address]  [place] [address]  Phone: [phone]  Fax: [phone]  Follow Up Time: 4-6 Days

## 2025-07-21 NOTE — ED PROVIDER NOTE - CARE PLAN
1 Principal Discharge DX:	Skin burn   Principal Discharge DX:	Explosion or rupture of pressurized tire, pipe, or hose as cause of accidental injury at trade or service area as place of occurrence

## 2025-07-21 NOTE — ED ADULT NURSE NOTE - CHIEF COMPLAINT QUOTE
Patient c/o burn in right antecubital area. Patient stated he works with [employer] and was greasing the container and the hole burst on his right arm causing burn and swelling. Sensation to the arm is intact. Offers no other complaints

## 2025-07-21 NOTE — CONSULT NOTE ADULT - ASSESSMENT
69y Male w/ R superficial skin wound s/p grease injury     PLAN    -WBAT RUE, ace wrap PRN   -pus drained from wound, irrigated, soft dressing applied   -no acute ortho surgery at this time  -pain control  -ice/cold compress, elevation  -dc on PO abx   -follow up outpatient with Dr. Castellano in 1 week, call office for appointment       For all questions related to patient care, please reach out via the on-call pager*    Shelli Nicole MD, PGY-3  Orthopaedic Surgery  [facility] [phone]  [facility]        [id number]  [facility]  [phone]/ [phone]

## 2025-07-21 NOTE — ED ADULT NURSE NOTE - OBJECTIVE STATEMENT
patient  came to [facility] with complaints of right antecubital area pain and swelling. asper patient he was at work and got burned with  pressurized grease gun. left ac 20g iv placed. labs sent

## 2025-07-21 NOTE — ED PROVIDER NOTE - PROGRESS NOTE DETAILS
Nicola Islas DO (PGY-2): Patient states after the drainage of his blister by Ortho/hand, patient is feeling symptomatically improved.  Neurovascularly intact distal.  Discussed ice and cold compresses, weightbearing as tolerated with Ace wrap as needed.  Will DC on Keflex and Bactrim for 1 week and have patient follow-up with Dr. Castellano within 1 week.  Return precautions discussed and patient is in agreement with the plan and will follow-up with Ortho hand and primary care doctor

## 2025-07-21 NOTE — ED PROVIDER NOTE - PRINCIPAL DIAGNOSIS
Skin burn Explosion or rupture of pressurized tire, pipe, or hose as cause of accidental injury at trade or service area as place of occurrence

## 2025-07-21 NOTE — ED PROVIDER NOTE - PHYSICAL EXAMINATION
Const: Awake, alert, no acute distress.  Appears well.   Moving comfortably on stretcher.  HEENT: NC/AT.  Moist mucous membranes.  Eyes: Extraocular movements intact b/l.  No scleral icterus.  Neck: Full ROM without pain.  Cardiac: Extremities well perfused, normal coloration, no peripheral cyanosis.  No peripheral edema.  Resp: Speaking in full sentences.  No evidence of respiratory distress.  Abd: Non-distended.  Skin: Normal coloration.  No abrasions or lacerations. 1-2 cm firm area of induration RUE AC fossa with overlying erythema, no warmth or drainage.  Neuro: Awake, alert & oriented x 3.  Moves all extremities symmetrically.  No obvious focal deficits.

## 2025-07-21 NOTE — ED PROVIDER NOTE - OBJECTIVE STATEMENT
69-year-old male with PMHx HTN, HLD, DM2, CAD with 1 stent on AC presenting for RUE burn x 2 days.  Patient states he was cleaning the fryer at work when hot oil shot out and hit him in the right arm.  Patient states area has been painful and was bleeding yesterday but has since stopped.  Denies fever, CP, SOB, abd pain, n/v. 69-year-old male with PMHx HTN, HLD, DM2, CAD with 1 stent on AC presenting for RUE pressure injury x 2 days. Patient works as a . Was using a pressurized grease gun at work when the tubing popped and hot grease shot out and hit him in the RUE just below the AC fossa. Pt feels grease went into the skin. Area has been painful and was bleeding yesterday but has since stopped.  Denies fever, CP, SOB, abd pain, n/v.

## 2025-07-21 NOTE — ED ADULT TRIAGE NOTE - CHIEF COMPLAINT QUOTE
Patient c/o burn in right antecubital area. Patient stated he works with [employer] and was greasing the container and the hole burst on his right arm causing burn and swelling. Sensation to the arm is intact. Offers no other complaints

## 2025-07-21 NOTE — CONSULT NOTE ADULT - SUBJECTIVE AND OBJECTIVE BOX
Ortho to see  XR of affected area  HPI  69y Male c/o R elbow wound since Saturday where a tube at work broke, and some grease splashed out and hit his arm, causing a wound. The wound initially was bleeding but it stopped. Denies fevers/chills. Denies numbness/tingling in the RUE. Denies other recent trauma.      ROS  Negative unless otherwise specified in HPI.    PAST MEDICAL & SURGICAL Hx  PAST MEDICAL & SURGICAL HISTORY:  Hypertension      Diabetes      CAD (coronary artery disease)      S/P right rotator cuff repair          MEDICATIONS  Home Medications:  Ecotrin Adult Low Strength 81 mg oral delayed release tablet: 1 tab(s) orally once a day (05 Nov 2024 04:15)  latanoprost 0.005% ophthalmic solution: 1 drop(s) in each eye once a day (at bedtime) (05 Nov 2024 04:15)  lisinopril 10 mg oral tablet: 1 tab(s) orally once a day (05 Nov 2024 04:15)  metoprolol succinate 25 mg oral tablet, extended release: 1 tab(s) orally once a day (05 Nov 2024 04:15)  Mounjaro 10 mg/0.5 mL subcutaneous solution: 10 milligram(s) subcutaneously (05 Nov 2024 04:15)      ALLERGIES  No Known Allergies      FAMILY Hx  FAMILY HISTORY:  No pertinent family history in first degree relatives        SOCIAL Hx  Social History:      VITALS  Vital Signs Last 24 Hrs  T(C): 36.7 (21 Jul 2025 19:54), Max: 36.7 (21 Jul 2025 19:54)  T(F): 98.1 (21 Jul 2025 19:54), Max: 98.1 (21 Jul 2025 19:54)  HR: 57 (21 Jul 2025 19:54) (57 - 57)  BP: 137/74 (21 Jul 2025 19:54) (137/74 - 137/74)  BP(mean): --  RR: 16 (21 Jul 2025 19:54) (16 - 16)  SpO2: 97% (21 Jul 2025 19:54) (97% - 97%)    Parameters below as of 21 Jul 2025 19:54  Patient On (Oxygen Delivery Method): room air        PHYSICAL EXAM  Gen: Lying in bed, non-toxic appearing, NAD  Resp: No increased WOB  RUE:  Small superficial pimple over volar aspect of proximal forearm/elbow, able to express pus   No deep swelling or fluctuance appreciated, compartments soft    Full passive/active elbow ROM   Motor: Musc/Med/Rad/AIN/PIN/U intact  Sensory: Musc/Med/Rad/U SILT  +Rad pulse, [facility]      LABS                        12.6   6.95  )-----------( 196      ( 21 Jul 2025 22:34 )             38.1     07-21    137  |  101  |  16  ----------------------------<  196[H]  4.3   |  24  |  1.11    Ca    9.2      21 Jul 2025 22:34    TPro  6.9  /  Alb  3.9  /  TBili  <0.2  /  DBili  x   /  AST  19  /  ALT  25  /  AlkPhos  81  07-21            IMAGING  XRs: R forearm without significant soft tissue swelling, no fx/dislocation (personal read)

## 2025-07-21 NOTE — ED PROVIDER NOTE - NSFOLLOWUPINSTRUCTIONS_ED_ALL_ED_FT
You were seen in the ED for a skin blister that formed after a burn from hot oil at work. Please follow up with your PCP and/ dermatologist in the next 2-3 days. Someone from the ED should call you to help facilitate your specialty follow up appointment.  If you have issues obtaining follow up, please call: [phone] to obtain a doctor or specialist who takes your insurance in your area.    Rest, drink plenty of fluids.  Advance activity as tolerated.  Continue all previously prescribed medications as directed. Cellulitis is a skin infection caused by bacteria. This condition occurs most often in the arms and lower legs but can occur anywhere over the body. Symptoms include redness, swelling, warm skin, tenderness, and chills/fever. If you were prescribed an antibiotic medicine, take it as told by your health care provider. Do not stop taking the antibiotic even if you start to feel better.    SEEK IMMEDIATE MEDICAL CARE IF YOU HAVE ANY OF THE FOLLOWING SYMPTOMS: worsening fever, red streaks coming from affected area, vomiting or diarrhea, or dizziness/lightheadedness. You were seen in the ED for pressurized injury to your right arm. You had blood work done here today. The results are attached within this packet, please bring it with you when you follow up with your PCP in the next 2-3 days. Someone from the ED should call you to help facilitate your specialty follow up appointment.  If you have issues obtaining follow up, please call: [phone] to obtain a doctor or specialist who takes your insurance in your area.    Rest, drink plenty of fluids.  Advance activity as tolerated.  Continue all previously prescribed medications as directed.  Cellulitis is a skin infection caused by bacteria. This condition occurs most often in the arms and lower legs but can occur anywhere over the body. Symptoms include redness, swelling, warm skin, tenderness, and chills/fever. If you were prescribed an antibiotic medicine, take it as told by your health care provider. Do not stop taking the antibiotic even if you start to feel better.    SEEK IMMEDIATE MEDICAL CARE IF YOU HAVE ANY OF THE FOLLOWING SYMPTOMS: worsening fever, red streaks coming from affected area, vomiting or diarrhea, or dizziness/lightheadedness. You were seen in the ED for pressurized injury to your right arm. You had blood work done here today. The results are attached within this packet, please bring it with you when you follow up with your PCP in the next 2-3 days. Two prescriptions for 2 different antibiotics were sent to your preferred pharmacy.  Please take them as prescribed until complete.  Please follow-up with Dr. Castellano (see above) within 1 week for reevaluation.  Please call the number above to schedule your appointment. Please keep your right arm clean and dry and apply ice as needed for pain and swelling.     Rest, drink plenty of fluids.  Advance activity as tolerated.  Continue all previously prescribed medications as directed.  Cellulitis is a skin infection caused by bacteria. This condition occurs most often in the arms and lower legs but can occur anywhere over the body. Symptoms include redness, swelling, warm skin, tenderness, and chills/fever. If you were prescribed an antibiotic medicine, take it as told by your health care provider. Do not stop taking the antibiotic even if you start to feel better.    SEEK IMMEDIATE MEDICAL CARE IF YOU HAVE ANY OF THE FOLLOWING SYMPTOMS: worsening fever, red streaks coming from affected area, vomiting or diarrhea, or dizziness/lightheadedness.

## 2025-07-21 NOTE — ED PROVIDER NOTE - CLINICAL SUMMARY MEDICAL DECISION MAKING FREE TEXT BOX
69-year-old male with PMHx HTN, HLD, DM2, CAD with 1 stent on AC presenting for RUE burn x 2 days.  Patient states he was cleaning the fryer at work when hot oil shot out and hit him in the right arm. VS wnl. PE: 1-2 cm firm area of induration RUE AC fossa with overlying erythema, no warmth or drainage.   DDx includes but is not limited to cellulitis, abscess, blister  Plan: pain controlled, will likely DC with abx and PCP/derm f/u 69-year-old male with PMHx HTN, HLD, DM2, CAD with 1 stent on AC presenting for RUE burn x 2 days.  Patient states he was cleaning the fryer at work when hot oil shot out and hit him in the right arm. VS wnl. PE: 1-2 cm firm area of induration RUE AC fossa with overlying erythema, no warmth or drainage.   DDx includes but is not limited to cellulitis, abscess, blister  Plan: pain controlled, will consult hand to see if wound needs to be washed out given pt 48 hrs out, will likely be discharged with abx and PCP/derm f/u

## 2025-07-22 RX ORDER — SULFAMETHOXAZOLE/TRIMETHOPRIM 800-160 MG
1 TABLET ORAL
Qty: 14 | Refills: 0
Start: 2025-07-22 | End: 2025-07-28

## 2025-07-22 RX ORDER — CEPHALEXIN 250 MG/1
1 CAPSULE ORAL
Qty: 28 | Refills: 0
Start: 2025-07-22 | End: 2025-07-28

## 2025-07-23 ENCOUNTER — APPOINTMENT (OUTPATIENT)
Dept: GASTROENTEROLOGY | Facility: AMBULATORY MEDICAL SERVICES | Age: 70
End: 2025-07-23

## 2025-07-24 ENCOUNTER — APPOINTMENT (OUTPATIENT)
Dept: OPHTHALMOLOGY | Facility: CLINIC | Age: 70
End: 2025-07-24

## 2025-08-01 ENCOUNTER — APPOINTMENT (OUTPATIENT)
Dept: PLASTIC SURGERY | Facility: CLINIC | Age: 70
End: 2025-08-01

## 2025-08-06 ENCOUNTER — APPOINTMENT (OUTPATIENT)
Dept: PLASTIC SURGERY | Facility: CLINIC | Age: 70
End: 2025-08-06

## 2025-08-08 ENCOUNTER — EMERGENCY (EMERGENCY)
Facility: HOSPITAL | Age: 70
LOS: 1 days | End: 2025-08-08
Attending: STUDENT IN AN ORGANIZED HEALTH CARE EDUCATION/TRAINING PROGRAM | Admitting: STUDENT IN AN ORGANIZED HEALTH CARE EDUCATION/TRAINING PROGRAM
Payer: COMMERCIAL

## 2025-08-08 VITALS
OXYGEN SATURATION: 100 % | HEART RATE: 59 BPM | RESPIRATION RATE: 16 BRPM | DIASTOLIC BLOOD PRESSURE: 72 MMHG | SYSTOLIC BLOOD PRESSURE: 142 MMHG | TEMPERATURE: 98 F

## 2025-08-08 VITALS
HEIGHT: 67 IN | RESPIRATION RATE: 16 BRPM | DIASTOLIC BLOOD PRESSURE: 71 MMHG | HEART RATE: 62 BPM | TEMPERATURE: 98 F | SYSTOLIC BLOOD PRESSURE: 136 MMHG | WEIGHT: 169.98 LBS | OXYGEN SATURATION: 97 %

## 2025-08-08 DIAGNOSIS — Z98.890 OTHER SPECIFIED POSTPROCEDURAL STATES: Chronic | ICD-10-CM

## 2025-08-08 LAB
ALBUMIN SERPL ELPH-MCNC: 4 G/DL — SIGNIFICANT CHANGE UP (ref 3.3–5)
ALP SERPL-CCNC: 77 U/L — SIGNIFICANT CHANGE UP (ref 40–120)
ALT FLD-CCNC: 30 U/L — SIGNIFICANT CHANGE UP (ref 4–41)
ANION GAP SERPL CALC-SCNC: 13 MMOL/L — SIGNIFICANT CHANGE UP (ref 7–14)
APPEARANCE UR: CLEAR — SIGNIFICANT CHANGE UP
AST SERPL-CCNC: 23 U/L — SIGNIFICANT CHANGE UP (ref 4–40)
BASOPHILS # BLD AUTO: 0.02 K/UL — SIGNIFICANT CHANGE UP (ref 0–0.2)
BASOPHILS NFR BLD AUTO: 0.3 % — SIGNIFICANT CHANGE UP (ref 0–2)
BILIRUB SERPL-MCNC: 0.2 MG/DL — SIGNIFICANT CHANGE UP (ref 0.2–1.2)
BILIRUB UR-MCNC: NEGATIVE — SIGNIFICANT CHANGE UP
BLOOD GAS VENOUS COMPREHENSIVE RESULT: SIGNIFICANT CHANGE UP
BUN SERPL-MCNC: 14 MG/DL — SIGNIFICANT CHANGE UP (ref 7–23)
CALCIUM SERPL-MCNC: 9.3 MG/DL — SIGNIFICANT CHANGE UP (ref 8.4–10.5)
CHLORIDE SERPL-SCNC: 100 MMOL/L — SIGNIFICANT CHANGE UP (ref 98–107)
CO2 SERPL-SCNC: 26 MMOL/L — SIGNIFICANT CHANGE UP (ref 22–31)
COLOR SPEC: YELLOW — SIGNIFICANT CHANGE UP
CREAT SERPL-MCNC: 1.1 MG/DL — SIGNIFICANT CHANGE UP (ref 0.5–1.3)
DIFF PNL FLD: NEGATIVE — SIGNIFICANT CHANGE UP
EGFR: 72 ML/MIN/1.73M2 — SIGNIFICANT CHANGE UP
EGFR: 72 ML/MIN/1.73M2 — SIGNIFICANT CHANGE UP
EOSINOPHIL # BLD AUTO: 0.19 K/UL — SIGNIFICANT CHANGE UP (ref 0–0.5)
EOSINOPHIL NFR BLD AUTO: 3.2 % — SIGNIFICANT CHANGE UP (ref 0–6)
GLUCOSE SERPL-MCNC: 167 MG/DL — HIGH (ref 70–99)
GLUCOSE UR QL: >=1000 MG/DL
HCT VFR BLD CALC: 36.8 % — LOW (ref 39–50)
HGB BLD-MCNC: 12.4 G/DL — LOW (ref 13–17)
IMM GRANULOCYTES # BLD AUTO: 0.01 K/UL — SIGNIFICANT CHANGE UP (ref 0–0.07)
IMM GRANULOCYTES NFR BLD AUTO: 0.2 % — SIGNIFICANT CHANGE UP (ref 0–0.9)
KETONES UR QL: NEGATIVE MG/DL — SIGNIFICANT CHANGE UP
LEUKOCYTE ESTERASE UR-ACNC: NEGATIVE — SIGNIFICANT CHANGE UP
LIDOCAIN IGE QN: 52 U/L — SIGNIFICANT CHANGE UP (ref 7–60)
LYMPHOCYTES # BLD AUTO: 2.14 K/UL — SIGNIFICANT CHANGE UP (ref 1–3.3)
LYMPHOCYTES NFR BLD AUTO: 35.8 % — SIGNIFICANT CHANGE UP (ref 13–44)
MCHC RBC-ENTMCNC: 27.2 PG — SIGNIFICANT CHANGE UP (ref 27–34)
MCHC RBC-ENTMCNC: 33.7 G/DL — SIGNIFICANT CHANGE UP (ref 32–36)
MCV RBC AUTO: 80.7 FL — SIGNIFICANT CHANGE UP (ref 80–100)
MONOCYTES # BLD AUTO: 0.36 K/UL — SIGNIFICANT CHANGE UP (ref 0–0.9)
MONOCYTES NFR BLD AUTO: 6 % — SIGNIFICANT CHANGE UP (ref 2–14)
NEUTROPHILS # BLD AUTO: 3.26 K/UL — SIGNIFICANT CHANGE UP (ref 1.8–7.4)
NEUTROPHILS NFR BLD AUTO: 54.5 % — SIGNIFICANT CHANGE UP (ref 43–77)
NITRITE UR-MCNC: NEGATIVE — SIGNIFICANT CHANGE UP
NRBC # BLD AUTO: 0 K/UL — SIGNIFICANT CHANGE UP (ref 0–0)
NRBC # FLD: 0 K/UL — SIGNIFICANT CHANGE UP (ref 0–0)
NRBC BLD AUTO-RTO: 0 /100 WBCS — SIGNIFICANT CHANGE UP (ref 0–0)
PH UR: 6.5 — SIGNIFICANT CHANGE UP (ref 5–8)
PLATELET # BLD AUTO: 188 K/UL — SIGNIFICANT CHANGE UP (ref 150–400)
PMV BLD: 11.9 FL — SIGNIFICANT CHANGE UP (ref 7–13)
POTASSIUM SERPL-MCNC: 4.4 MMOL/L — SIGNIFICANT CHANGE UP (ref 3.5–5.3)
POTASSIUM SERPL-SCNC: 4.4 MMOL/L — SIGNIFICANT CHANGE UP (ref 3.5–5.3)
PROT SERPL-MCNC: 6.5 G/DL — SIGNIFICANT CHANGE UP (ref 6–8.3)
PROT UR-MCNC: NEGATIVE MG/DL — SIGNIFICANT CHANGE UP
RBC # BLD: 4.56 M/UL — SIGNIFICANT CHANGE UP (ref 4.2–5.8)
RBC # FLD: 12.5 % — SIGNIFICANT CHANGE UP (ref 10.3–14.5)
SODIUM SERPL-SCNC: 139 MMOL/L — SIGNIFICANT CHANGE UP (ref 135–145)
SP GR SPEC: 1.02 — SIGNIFICANT CHANGE UP (ref 1–1.03)
UROBILINOGEN FLD QL: 0.2 MG/DL — SIGNIFICANT CHANGE UP (ref 0.2–1)
WBC # BLD: 5.98 K/UL — SIGNIFICANT CHANGE UP (ref 3.8–10.5)
WBC # FLD AUTO: 5.98 K/UL — SIGNIFICANT CHANGE UP (ref 3.8–10.5)

## 2025-08-08 PROCEDURE — 99285 EMERGENCY DEPT VISIT HI MDM: CPT

## 2025-08-08 PROCEDURE — 74177 CT ABD & PELVIS W/CONTRAST: CPT | Mod: 26
